# Patient Record
Sex: MALE | Race: BLACK OR AFRICAN AMERICAN | Employment: FULL TIME | URBAN - METROPOLITAN AREA
[De-identification: names, ages, dates, MRNs, and addresses within clinical notes are randomized per-mention and may not be internally consistent; named-entity substitution may affect disease eponyms.]

---

## 2017-08-21 ENCOUNTER — OFFICE VISIT (OUTPATIENT)
Dept: UROLOGY | Age: 62
End: 2017-08-21

## 2017-08-21 ENCOUNTER — HOSPITAL ENCOUNTER (OUTPATIENT)
Dept: LAB | Age: 62
Discharge: HOME | End: 2017-08-21
Payer: COMMERCIAL

## 2017-08-21 VITALS
HEART RATE: 78 BPM | SYSTOLIC BLOOD PRESSURE: 124 MMHG | TEMPERATURE: 99 F | BODY MASS INDEX: 35.76 KG/M2 | DIASTOLIC BLOOD PRESSURE: 76 MMHG | OXYGEN SATURATION: 97 % | WEIGHT: 264 LBS | HEIGHT: 72 IN

## 2017-08-21 DIAGNOSIS — N40.1 BPH LOC W URIN OBS/LUTS: ICD-10-CM

## 2017-08-21 DIAGNOSIS — N52.02 CORPORO-VENOUS OCCLUSIVE ERECTILE DYSFUNCTION: ICD-10-CM

## 2017-08-21 DIAGNOSIS — R35.0 FREQUENT URINATION: Primary | ICD-10-CM

## 2017-08-21 DIAGNOSIS — R35.1 NOCTURIA: ICD-10-CM

## 2017-08-21 LAB
BILIRUB UR QL STRIP: NEGATIVE
GLUCOSE UR-MCNC: NEGATIVE MG/DL
KETONES P FAST UR STRIP-MCNC: NEGATIVE MG/DL
PH UR STRIP: 5.5 [PH] (ref 4.6–8)
PROT UR QL STRIP: NEGATIVE MG/DL
PSA SERPL-MCNC: 1.1 NG/ML (ref 0–4)
SP GR UR STRIP: 1.02 (ref 1–1.03)
UA UROBILINOGEN AMB POC: NORMAL (ref 0.2–1)
URINALYSIS CLARITY POC: CLEAR
URINALYSIS COLOR POC: YELLOW
URINE BLOOD POC: NORMAL
URINE LEUKOCYTES POC: NEGATIVE
URINE NITRITES POC: NEGATIVE

## 2017-08-21 PROCEDURE — 84153 ASSAY OF PSA TOTAL: CPT | Performed by: UROLOGY

## 2017-08-21 RX ORDER — TAMSULOSIN HYDROCHLORIDE 0.4 MG/1
0.4 CAPSULE ORAL DAILY
Qty: 30 CAP | Refills: 9 | Status: SHIPPED | OUTPATIENT
Start: 2017-08-21 | End: 2019-10-07 | Stop reason: ALTCHOICE

## 2017-08-21 RX ORDER — VARDENAFIL HYDROCHLORIDE 20 MG/1
20 TABLET ORAL AS NEEDED
Qty: 12 TAB | Refills: 6 | Status: SHIPPED | OUTPATIENT
Start: 2017-08-21 | End: 2019-10-07 | Stop reason: ALTCHOICE

## 2017-08-21 NOTE — PATIENT INSTRUCTIONS
Urine Test: About This Test  What is it? A urine test checks the color, clarity (clear or cloudy), odor, concentration, and acidity (pH) of your urine. It also checks your levels of protein, sugar, blood cells, or other substances in your urine. This test is sometimes called a urinalysis. Why is this test done? A urine test may be done:  · To check for a disease or infection of the urinary tract. The urinary tract includes the kidneys, the tubes that carry urine from the kidneys to the bladder (ureters), and the bladder. It also includes the tube that carries urine from the bladder to outside the body (urethra). · To check the treatment of conditions such as diabetes, kidney stones, a urinary tract infection (UTI), high blood pressure, or some kidney or liver diseases. How can you prepare for the test?  · Before the test, don't eat foods that can change the color of your urine. Examples of these include blackberries, beets, and rhubarb. · Don't do heavy exercise before the test.  · Tell your doctor if you are menstruating or close to starting your period. Your doctor may want to wait to do the test.  · Tell your doctor about all the nonprescription and prescription medicines and herbs or other supplements you take. Some of these can affect the results of this test.  What happens during the test?  A urine test can be done in your doctor's office, clinic, or lab. Or you may be asked to collect a urine sample at home. Then you can take it to the office or lab for testing. Clean-catch midstream urine collection  · Wash your hands before you start. · If the cup you are given has a lid, remove it carefully. Set it down with the inner surface up. Don't touch the inside of the cup with your fingers. · Clean the area around your genitals. ¨ For men: Pull back the foreskin, if present. Clean the head of your penis with medicated towelettes or swabs.   ¨ For women: Spread open the genital folds of skin with one hand. Then use medicated towelettes or swabs in your other hand to clean the area where urine comes out (the urethra). Wipe the area from front to back. · Start urinating into the toilet or urinal. A woman should hold apart the genital folds of skin while she urinates. · After the urine has flowed for several seconds, place the cup into the urine stream. Collect about 2 ounces of urine without stopping your flow of urine. · Don't touch the rim of the cup to your genital area. Don't get toilet paper, pubic hair, stool (feces), menstrual blood, or anything else in the urine sample. · Finish urinating into the toilet or urinal.  · Carefully replace and tighten the lid on the cup, and then return it to the lab. If you are collecting the urine at home and can't get it to the lab in an hour, refrigerate it. Double-voided urine sample collection  This method collects the urine your body is making right now. · Urinate into the toilet or urinal. Don't collect any of this urine. · Drink a large glass of water, and wait about 30 to 40 minutes. · Then get a urine sample. Follow the instructions above for collecting a clean-catch urine sample. · Take the urine sample to the lab. If you are collecting the urine at home and can't get it to the lab in an hour, refrigerate it. Follow-up care is a key part of your treatment and safety. Be sure to make and go to all appointments, and call your doctor if you are having problems. It's also a good idea to keep a list of the medicines you take. Ask your doctor when you can expect to have your test results. Where can you learn more? Go to http://christy-lavinia.info/. Enter R266 in the search box to learn more about \"Urine Test: About This Test.\"  Current as of: October 14, 2016  Content Version: 11.3  © 9660-8758 [employer].  Care instructions adapted under license by [employer] (which disclaims liability or warranty for this information). If you have questions about a medical condition or this instruction, always ask your healthcare professional. [place] [address] any warranty or liability for your use of this information.

## 2017-08-21 NOTE — PROGRESS NOTES
Curly Winter 62 y.o. male     Mr. Vaughn Sheets seen today for evaluation of obstructive and irritative voiding symptoms as well as erectile dysfunction  Patient describes a solid stream with diminished caliber and intermittency not associated with dysuria or hematuria-symptoms progressing slowly over the past few years nocturia 2-3 episodes per night attributed to hydrochlorothiazide diuretic treatment of hypertension patient also has family history of prostate malignancy 2 older brothers  In their 62s treated for prostate malignancy  Erectile dysfunction responding somewhat favorably to Cialis but unfavorable side effects prompted discontinuation of [place] treatment    Patient has no history of  tract disease, trauma, or surgery      PSA 1.5 in 2015    Review of Systems:   CNS: No seizure syncope headaches dizziness or visual changes  Respiratory: No wheezing no shortness of breath  Cardiovascular: No angina no chest pain-hypertension  Intestinal: GERD-no diarrhea no constipation  Urinary: Obstructive and irritative voiding symptoms  Skeletal: No bone or joint pain  Endocrine: No diabetes  Other: Erectile dysfunction    Allergies: No Known Allergies   Medications:    Current Outpatient Prescriptions   Medication Sig Dispense Refill    OTHER daily. Indications: neutrogena testosterone booster      tamsulosin (FLOMAX) 0.4 mg capsule Take 1 Cap by mouth daily. Indications: SYMPTOMATIC BENIGN PROSTATIC HYPERPLASIA 30 Cap 9    vardenafil (LEVITRA) 20 mg tablet Take 20 mg by mouth as needed. 12 Tab 6    Cholecalciferol, Vitamin D3, 1,000 unit cap Take  by mouth.  hydrochlorothiazide (HYDRODIURIL) 25 mg tablet Take 25 mg by mouth daily.  sitaGLIPtin (JANUVIA) 50 mg tablet Take 50 mg by mouth daily.  amLODIPine-benazepril (LOTREL) 10-20 mg per capsule Take 1 Cap by mouth daily.  ketoconazole (NIZORAL) 2 % shampoo Apply  to affected area daily as needed for Itching.       pantoprazole (PROTONIX) 40 mg granules for oral suspension 40 mg daily.  budesonide-formoterol (SYMBICORT) 160-4.5 mcg/actuation HFA inhaler Take 2 Puffs by inhalation two (2) times a day. Past Medical History:   Diagnosis Date    Aortic insufficiency     Chronic airway obstruction (HCC)     Diabetes mellitus (HCC)     Erectile dysfunction     Hepatitis B carrier (HCC)     Hyperglycemia     Hypertension     Mitral regurgitation     trace    Onychomycosis     Polyp of colon     Stiff joint     Stomach pain     Tobacco abuse disorder     Vitamin D deficiency       Past Surgical History:   Procedure Laterality Date    HX COLONOSCOPY       Family History   Problem Relation Age of Onset    Heart Disease Mother     Other Brother      Prostate problem     Other Brother         Physical Examination: Well-nourished mature male in no apparent distress    Abdomen is nontender no palpable masses no organomegaly  Back-no percussion CVA tenderness on either side  No inguinal hernia or adenopathy  Penis is normal  Testes are normal in size shape and consistency bilaterally-no epididymal induration or tenderness on either side  Spermatic cords are palpably normal bilaterally  Scrotum is normal  Prostate by SEVEN is rounded, smooth, benign in consistency and nontender-no induration no nodularity  No rectal masses tenderness or induration      Urinalysis: Negative dipstick/nitrite negative    PVR today 30 cc        Impression: Symptomatic BPH-L UTS                        Family history of prostate malignancy                        Erectile dysfunction        Plan: Flomax 0.4 mg daily #90 refill ×3            Levitra 20 mg #12 refill ×6 [facility] pharmacy referral            PSA today    rtc 6 mo pvr          Chantelle Hanson MD  -electronically signed-    PLEASE NOTE:  This document has been produced using voice recognition software. Unrecognized errors in transcription may be present.

## 2017-08-21 NOTE — PROGRESS NOTES
Mr. Calos Gonzalez has a reminder for a \"due or due soon\" health maintenance. I have asked that he contact his primary care provider for follow-up on this health maintenance. RBV per Dr. Vincent Sanchez blood drawn in office today for PSA.

## 2017-08-21 NOTE — MR AVS SNAPSHOT
Visit Information Date & Time Provider Department Dept. Phone Encounter #  
 8/21/2017 10:00 AM [facility] Weber[facility] [address] [facility] [id number] Follow-up Instructions Return in about 6 months (around 2/21/2018) for Follow-up BPH PVR. Follow-up and Disposition History Your Appointments 2/20/2018 10:00 AM  
ULTRASOUND with Reid Weber MD  
[facility] [address] [facility]) Appt Note: PVR  
 [address] [place] [address] [address]  
[phone] [address] [place] [id number] Upcoming Health Maintenance Date Due Hepatitis C Screening 1955 DTaP/Tdap/Td series (1 - Tdap) 8/22/1977 FOBT Q 1 YEAR AGE 50-75 8/22/2006 ZOSTER VACCINE AGE 60> 6/22/2016 INFLUENZA AGE 9 TO ADULT 8/1/2017 Allergies as of 8/21/2017  Review Complete On: 8/21/2017 By: Reid Weber MD  
 No Known Allergies Current Immunizations  Never Reviewed No immunizations on file. Not reviewed this visit You Were Diagnosed With   
  
 Codes Comments Frequent urination    -  Primary ICD-10-CM: R35.0 ICD-9-CM: 788.41   
 BPH loc w urin obs/LUTS     ICD-10-CM: N40.1 ICD-9-CM: 600.21, 599.69 Corporo-venous occlusive erectile dysfunction     ICD-10-CM: N52.02 
ICD-9-CM: 607.84 Vitals BP Pulse Temp Height(growth percentile) Weight(growth percentile) SpO2  
 124/76 (BP 1 Location: Left arm, BP Patient Position: Sitting) 78 99 °F (37.2 °C) 6' (1.829 m) 264 lb (119.7 kg) 97% BMI Smoking Status 35.8 kg/m2 Former Smoker Vitals History BMI and BSA Data Body Mass Index Body Surface Area  
 35.8 kg/m 2 2.47 m 2 Preferred Pharmacy Pharmacy Name Phone [address], [address] [place] [address] [phone] Your Updated Medication List  
  
   
 This list is accurate as of: 8/21/17 11:03 AM.  Always use your most recent med list.  
  
  
  
  
 cholecalciferol 1,000 unit Cap Commonly known as:  VITAMIN D3 Take  by mouth. hydroCHLOROthiazide 25 mg tablet Commonly known as:  HYDRODIURIL Take 25 mg by mouth daily. JANUVIA 50 mg tablet Generic drug:  SITagliptin Take 50 mg by mouth daily. LOTREL 10-20 mg per capsule Generic drug:  amLODIPine-benazepril Take 1 Cap by mouth daily. NIZORAL 2 % shampoo Generic drug:  ketoconazole Apply  to affected area daily as needed for Itching. OTHER  
daily. Indications: neutrogena testosterone booster PROTONIX 40 mg granules for oral suspension Generic drug:  pantoprazole 40 mg daily. SYMBICORT 160-4.5 mcg/actuation HFA inhaler Generic drug:  budesonide-formoterol Take 2 Puffs by inhalation two (2) times a day. tamsulosin 0.4 mg capsule Commonly known as:  FLOMAX Take 1 Cap by mouth daily. Indications: SYMPTOMATIC BENIGN PROSTATIC HYPERPLASIA  
  
 vardenafil 20 mg tablet Commonly known as:  LEVITRA Take 20 mg by mouth as needed. Prescriptions Printed Refills  
 tamsulosin (FLOMAX) 0.4 mg capsule 9 Sig: Take 1 Cap by mouth daily. Indications: SYMPTOMATIC BENIGN PROSTATIC HYPERPLASIA Class: Print Route: Oral  
 vardenafil (LEVITRA) 20 mg tablet 6 Sig: Take 20 mg by mouth as needed. Class: Print Route: Oral  
  
We Performed the Following AMB POC URINALYSIS DIP STICK AUTO W/O MICRO [95190 CPT(R)] CA COLLECTION VENOUS BLOOD,VENIPUNCTURE [id number] CPT(R)] CA FEROZ,POST-VOID RES,US,NON-IMAGING [id number] CPT(R)] Follow-up Instructions Return in about 6 months (around 2/21/2018) for Follow-up BPH PVR. To-Do List   
 08/21/2017 Lab:  PSA, DIAGNOSTIC (PROSTATE SPECIFIC AG) Patient Instructions Urine Test: About This Test 
What is it? A urine test checks the color, clarity (clear or cloudy), odor, concentration, and acidity (pH) of your urine. It also checks your levels of protein, sugar, blood cells, or other substances in your urine. This test is sometimes called a urinalysis. Why is this test done? A urine test may be done: · To check for a disease or infection of the urinary tract. The urinary tract includes the kidneys, the tubes that carry urine from the kidneys to the bladder (ureters), and the bladder. It also includes the tube that carries urine from the bladder to outside the body (urethra). · To check the treatment of conditions such as diabetes, kidney stones, a urinary tract infection (UTI), high blood pressure, or some kidney or liver diseases. How can you prepare for the test? 
· Before the test, don't eat foods that can change the color of your urine. Examples of these include blackberries, beets, and rhubarb. · Don't do heavy exercise before the test. 
· Tell your doctor if you are menstruating or close to starting your period. Your doctor may want to wait to do the test. 
· Tell your doctor about all the nonprescription and prescription medicines and herbs or other supplements you take. Some of these can affect the results of this test. 
What happens during the test? 
A urine test can be done in your doctor's office, clinic, or lab. Or you may be asked to collect a urine sample at home. Then you can take it to the office or lab for testing. Clean-catch midstream urine collection · Wash your hands before you start. · If the cup you are given has a lid, remove it carefully. Set it down with the inner surface up. Don't touch the inside of the cup with your fingers. · Clean the area around your genitals. ¨ For men: Pull back the foreskin, if present. Clean the head of your penis with medicated towelettes or swabs. ¨ For women: Spread open the genital folds of skin with one hand.  Then use medicated towelettes or swabs in your other hand to clean the area where urine comes out (the urethra). Wipe the area from front to back. · Start urinating into the toilet or urinal. A woman should hold apart the genital folds of skin while she urinates. · After the urine has flowed for several seconds, place the cup into the urine stream. Collect about 2 ounces of urine without stopping your flow of urine. · Don't touch the rim of the cup to your genital area. Don't get toilet paper, pubic hair, stool (feces), menstrual blood, or anything else in the urine sample. · Finish urinating into the toilet or urinal. 
· Carefully replace and tighten the lid on the cup, and then return it to the lab. If you are collecting the urine at home and can't get it to the lab in an hour, refrigerate it. Double-voided urine sample collection This method collects the urine your body is making right now. · Urinate into the toilet or urinal. Don't collect any of this urine. · Drink a large glass of water, and wait about 30 to 40 minutes. · Then get a urine sample. Follow the instructions above for collecting a clean-catch urine sample. · Take the urine sample to the lab. If you are collecting the urine at home and can't get it to the lab in an hour, refrigerate it. Follow-up care is a key part of your treatment and safety. Be sure to make and go to all appointments, and call your doctor if you are having problems. It's also a good idea to keep a list of the medicines you take. Ask your doctor when you can expect to have your test results. Where can you learn more? Go to http://christy-lavinia.info/. Enter R266 in the search box to learn more about \"Urine Test: About This Test.\" Current as of: October 14, 2016 Content Version: 11.3 © 4403-5152 [employer].  Care instructions adapted under license by [employer] (which disclaims liability or warranty for this information). If you have questions about a medical condition or this instruction, always ask your healthcare professional. [address] any warranty or liability for your use of this information. Patient Instructions History Introducing [facility]! Abena [facility] introduces [employer] patient portal. Now you can access parts of your medical record, email your doctor's office, and request medication refills online. 1. In your internet browser, go to [email or web address] 2. Click on the First Time User? Click Here link in the Sign In box. You will see the New Member Sign Up page. 3. Enter your [employer] Access Code exactly as it appears below. You will not need to use this code after youve completed the sign-up process. If you do not sign up before the expiration date, you must request a new code. · [employer] Access Code: [email or web address] Expires: 11/19/2017  9:57 AM 
 
4. Enter the last four digits of your Social Security Number (xxxx) and Date of Birth (mm/dd/yyyy) as indicated and click Submit. You will be taken to the next sign-up page. 5. Create a [employer] ID. This will be your [employer] login ID and cannot be changed, so think of one that is secure and easy to remember. 6. Create a [employer] password. You can change your password at any time. 7. Enter your Password Reset Question and Answer. This can be used at a later time if you forget your password. 8. Enter your e-mail address. You will receive e-mail notification when new information is available in [address] 9. Click Sign Up. You can now view and download portions of your medical record. 10. Click the Download Summary menu link to download a portable copy of your medical information. If you have questions, please visit the Frequently Asked Questions section of the [employer] website. Remember, [employer] is NOT to be used for urgent needs. For medical emergencies, dial 911. Now available from your iPhone and Android! Please provide this summary of care documentation to your next provider. Your primary care clinician is listed as Kinjal Gamez. If you have any questions after today's visit, please call [phone].

## 2018-06-19 ENCOUNTER — HOSPITAL ENCOUNTER (OUTPATIENT)
Dept: GENERAL RADIOLOGY | Age: 63
Discharge: HOME | End: 2018-06-19
Payer: COMMERCIAL

## 2018-06-19 DIAGNOSIS — M54.30 SCIATICA: ICD-10-CM

## 2018-06-19 PROCEDURE — 72114 X-RAY EXAM L-S SPINE BENDING: CPT

## 2018-06-19 PROCEDURE — 73502 X-RAY EXAM HIP UNI 2-3 VIEWS: CPT

## 2021-07-24 ENCOUNTER — HOSPITAL ENCOUNTER (EMERGENCY)
Age: 66
Discharge: HOME | DRG: 872 | End: 2021-07-24
Attending: EMERGENCY MEDICINE
Payer: COMMERCIAL

## 2021-07-24 ENCOUNTER — APPOINTMENT (OUTPATIENT)
Dept: GENERAL RADIOLOGY | Age: 66
DRG: 872 | End: 2021-07-24
Attending: EMERGENCY MEDICINE
Payer: COMMERCIAL

## 2021-07-24 VITALS
BODY MASS INDEX: 38.6 KG/M2 | SYSTOLIC BLOOD PRESSURE: 95 MMHG | OXYGEN SATURATION: 96 % | RESPIRATION RATE: 22 BRPM | WEIGHT: 285 LBS | HEIGHT: 72 IN | TEMPERATURE: 102.5 F | HEART RATE: 111 BPM | DIASTOLIC BLOOD PRESSURE: 38 MMHG

## 2021-07-24 DIAGNOSIS — N39.0 URINARY TRACT INFECTION WITHOUT HEMATURIA, SITE UNSPECIFIED: Primary | ICD-10-CM

## 2021-07-24 LAB
ANION GAP SERPL CALC-SCNC: 5 MMOL/L (ref 3–18)
APPEARANCE UR: ABNORMAL
BACTERIA URNS QL MICRO: ABNORMAL /HPF
BASOPHILS # BLD: 0 K/UL (ref 0–0.1)
BASOPHILS NFR BLD: 0 % (ref 0–2)
BILIRUB UR QL: NEGATIVE
BUN SERPL-MCNC: 19 MG/DL (ref 7–18)
BUN/CREAT SERPL: 14 (ref 12–20)
CALCIUM SERPL-MCNC: 8.8 MG/DL (ref 8.5–10.1)
CHLORIDE SERPL-SCNC: 101 MMOL/L (ref 100–111)
CK MB CFR SERPL CALC: ABNORMAL % (ref 0–4)
CK MB SERPL-MCNC: <1 NG/ML (ref 5–25)
CK SERPL-CCNC: 532 U/L (ref 39–308)
CO2 SERPL-SCNC: 30 MMOL/L (ref 21–32)
COLOR UR: ABNORMAL
COVID-19 RAPID TEST, COVR: NOT DETECTED
CREAT SERPL-MCNC: 1.35 MG/DL (ref 0.6–1.3)
DIFFERENTIAL METHOD BLD: ABNORMAL
EOSINOPHIL # BLD: 0 K/UL (ref 0–0.4)
EOSINOPHIL NFR BLD: 0 % (ref 0–5)
EPITH CASTS URNS QL MICRO: ABNORMAL /LPF (ref 0–5)
ERYTHROCYTE [DISTWIDTH] IN BLOOD BY AUTOMATED COUNT: 14.4 % (ref 11.6–14.5)
GLUCOSE SERPL-MCNC: 188 MG/DL (ref 74–99)
GLUCOSE UR STRIP.AUTO-MCNC: NEGATIVE MG/DL
HCT VFR BLD AUTO: 36.8 % (ref 36–48)
HGB BLD-MCNC: 12.2 G/DL (ref 13–16)
HGB UR QL STRIP: ABNORMAL
KETONES UR QL STRIP.AUTO: ABNORMAL MG/DL
LACTATE BLD-SCNC: 1.66 MMOL/L (ref 0.4–2)
LEUKOCYTE ESTERASE UR QL STRIP.AUTO: ABNORMAL
LYMPHOCYTES # BLD: 1 K/UL (ref 0.9–3.6)
LYMPHOCYTES NFR BLD: 8 % (ref 21–52)
MCH RBC QN AUTO: 29.3 PG (ref 24–34)
MCHC RBC AUTO-ENTMCNC: 33.2 G/DL (ref 31–37)
MCV RBC AUTO: 88.2 FL (ref 74–97)
MONOCYTES # BLD: 0.6 K/UL (ref 0.05–1.2)
MONOCYTES NFR BLD: 5 % (ref 3–10)
MUCOUS THREADS URNS QL MICRO: ABNORMAL /LPF
NEUTS SEG # BLD: 10.2 K/UL (ref 1.8–8)
NEUTS SEG NFR BLD: 86 % (ref 40–73)
NITRITE UR QL STRIP.AUTO: NEGATIVE
PH UR STRIP: 5.5 [PH] (ref 5–8)
PLATELET # BLD AUTO: 183 K/UL (ref 135–420)
PMV BLD AUTO: 9.9 FL (ref 9.2–11.8)
POTASSIUM SERPL-SCNC: 3.9 MMOL/L (ref 3.5–5.5)
PROT UR STRIP-MCNC: 100 MG/DL
RBC # BLD AUTO: 4.17 M/UL (ref 4.35–5.65)
RBC #/AREA URNS HPF: ABNORMAL /HPF (ref 0–5)
SODIUM SERPL-SCNC: 136 MMOL/L (ref 136–145)
SOURCE, COVRS: NORMAL
SP GR UR REFRACTOMETRY: 1.03 (ref 1–1.03)
TROPONIN I SERPL-MCNC: <0.02 NG/ML (ref 0–0.04)
UROBILINOGEN UR QL STRIP.AUTO: 2 EU/DL (ref 0.2–1)
WBC # BLD AUTO: 11.9 K/UL (ref 4.6–13.2)
WBC URNS QL MICRO: ABNORMAL /HPF (ref 0–4)

## 2021-07-24 PROCEDURE — 81001 URINALYSIS AUTO W/SCOPE: CPT

## 2021-07-24 PROCEDURE — 99285 EMERGENCY DEPT VISIT HI MDM: CPT

## 2021-07-24 PROCEDURE — 71045 X-RAY EXAM CHEST 1 VIEW: CPT

## 2021-07-24 PROCEDURE — 82553 CREATINE MB FRACTION: CPT

## 2021-07-24 PROCEDURE — 93005 ELECTROCARDIOGRAM TRACING: CPT

## 2021-07-24 PROCEDURE — 80048 BASIC METABOLIC PNL TOTAL CA: CPT

## 2021-07-24 PROCEDURE — 85025 COMPLETE CBC W/AUTO DIFF WBC: CPT

## 2021-07-24 PROCEDURE — 87040 BLOOD CULTURE FOR BACTERIA: CPT

## 2021-07-24 PROCEDURE — 96374 THER/PROPH/DIAG INJ IV PUSH: CPT

## 2021-07-24 PROCEDURE — 74011250636 HC RX REV CODE- 250/636: Performed by: EMERGENCY MEDICINE

## 2021-07-24 PROCEDURE — 87186 SC STD MICRODIL/AGAR DIL: CPT

## 2021-07-24 PROCEDURE — 87635 SARS-COV-2 COVID-19 AMP PRB: CPT

## 2021-07-24 PROCEDURE — 74011000250 HC RX REV CODE- 250: Performed by: EMERGENCY MEDICINE

## 2021-07-24 PROCEDURE — 87077 CULTURE AEROBIC IDENTIFY: CPT

## 2021-07-24 PROCEDURE — 83605 ASSAY OF LACTIC ACID: CPT

## 2021-07-24 RX ORDER — SODIUM CHLORIDE 0.9 % (FLUSH) 0.9 %
5-10 SYRINGE (ML) INJECTION AS NEEDED
Status: DISCONTINUED | OUTPATIENT
Start: 2021-07-24 | End: 2021-07-24 | Stop reason: HOSPADM

## 2021-07-24 RX ORDER — CEPHALEXIN 500 MG/1
500 CAPSULE ORAL 4 TIMES DAILY
Qty: 40 CAPSULE | Refills: 0 | Status: SHIPPED | OUTPATIENT
Start: 2021-07-24 | End: 2021-07-27

## 2021-07-24 RX ADMIN — SODIUM CHLORIDE 2328 ML: 900 INJECTION, SOLUTION INTRAVENOUS at 16:37

## 2021-07-24 RX ADMIN — CEFEPIME HYDROCHLORIDE 2 G: 2 INJECTION, POWDER, FOR SOLUTION INTRAVENOUS at 17:11

## 2021-07-24 NOTE — ED PROVIDER NOTES
EMERGENCY DEPARTMENT HISTORY AND PHYSICAL EXAM    7:21 PM late entry, patient was seen, earlier in the super track room      Date: 7/24/2021  Patient Name: Kasi Manzano    History of Presenting Illness     Chief Complaint   Patient presents with    Fever    Chills    Urinary Frequency         History Provided By: patient    Additional History (Context): Kasi Manzano is a 60 y.o. male presents with several days of not feeling well this morning was very lethargic, did not feel like going and running around with his wife so was brought here to the emergency department bit of dysuria no cough no vomiting diarrhea or abdominal pain. He has had some shaking chills and sweats. Minimal discomfort. PCP: Chloe Callahan MD    Chief Complaint:   Duration:    Timing:    Location:   Quality:   Severity:   Modifying Factors:   Associated Symptoms:       Current Facility-Administered Medications   Medication Dose Route Frequency Provider Last Rate Last Admin    sodium chloride (NS) flush 5-10 mL  5-10 mL IntraVENous PRN Crystal Umana MD         Current Outpatient Medications   Medication Sig Dispense Refill    cephALEXin (Keflex) 500 mg capsule Take 1 Capsule by mouth four (4) times daily for 10 days. 40 Capsule 0    glimepiride (AMARYL) 2 mg tablet       sildenafil citrate (VIAGRA) 100 mg tablet Take 1 Tab by mouth daily as needed (ED). Take 1/2 to 1 tablet as needed. 10 Tab 6    tamsulosin (FLOMAX) 0.4 mg capsule Take 1 Cap by mouth daily (after dinner). 90 Cap 3    OTHER daily. Indications: neutrogena testosterone booster      Cholecalciferol, Vitamin D3, 1,000 unit cap Take  by mouth.  hydrochlorothiazide (HYDRODIURIL) 25 mg tablet Take 25 mg by mouth daily.  sitaGLIPtin (JANUVIA) 50 mg tablet Take 50 mg by mouth daily.  amLODIPine-benazepril (LOTREL) 10-20 mg per capsule Take 1 Cap by mouth daily.       ketoconazole (NIZORAL) 2 % shampoo Apply  to affected area daily as needed for Itching.  budesonide-formoterol (SYMBICORT) 160-4.5 mcg/actuation HFA inhaler Take 2 Puffs by inhalation two (2) times a day. Past History     Past Medical History:  Past Medical History:   Diagnosis Date    Aortic insufficiency     Chronic airway obstruction (HCC)     Diabetes mellitus (HCC)     Erectile dysfunction     Feeling of incomplete bladder emptying     Hepatitis B carrier (HCC)     Hyperglycemia     Hypertension     Mitral regurgitation     trace    Onychomycosis     Polyp of colon     Stiff joint     Stomach pain     Tobacco abuse disorder     Urinary urgency     Vitamin D deficiency        Past Surgical History:  Past Surgical History:   Procedure Laterality Date    HX COLONOSCOPY         Family History:  Family History   Problem Relation Age of Onset    Heart Disease Mother     Other Brother         Prostate problem     Other Brother        Social History:  Social History     Tobacco Use    Smoking status: Former Smoker     Packs/day: 1.00     Years: 30.00     Pack years: 30.00     Types: Cigarettes     Quit date: 2009     Years since quittin.8    Smokeless tobacco: Never Used   Substance Use Topics    Alcohol use: Yes     Alcohol/week: 0.0 standard drinks     Comment: occassionaly    Drug use: No       Allergies: Allergies   Allergen Reactions    Metformin Other (comments)     dizziness         Review of Systems     Review of Systems   Constitutional: Positive for diaphoresis and fever. HENT: Negative for congestion and sore throat. Eyes: Negative for pain and itching. Respiratory: Negative for cough and shortness of breath. Cardiovascular: Negative for chest pain and palpitations. Gastrointestinal: Negative for abdominal pain and diarrhea. Endocrine: Negative for polydipsia and polyuria. Genitourinary: Positive for dysuria. Negative for hematuria. Musculoskeletal: Negative for arthralgias and myalgias.    Skin: Negative for rash and wound.   Neurological: Negative for seizures and syncope. Hematological: Does not bruise/bleed easily. Psychiatric/Behavioral: Negative for agitation and hallucinations. Physical Exam       Patient Vitals for the past 12 hrs:   Temp Pulse Resp BP SpO2   07/24/21 1614 (!) 102.5 °F (39.2 °C) (!) 126 26 (!) 138/90 97 %       Physical Exam  Vitals and nursing note reviewed. Constitutional:       General: He is not in acute distress. Appearance: Normal appearance. He is well-developed. He is not ill-appearing. HENT:      Head: Normocephalic and atraumatic. Eyes:      General: No scleral icterus. Conjunctiva/sclera: Conjunctivae normal.   Neck:      Vascular: No JVD. Cardiovascular:      Rate and Rhythm: Normal rate and regular rhythm. Heart sounds: Normal heart sounds. Comments: 4 intact extremity pulses  Pulmonary:      Effort: Pulmonary effort is normal.      Breath sounds: Normal breath sounds. Abdominal:      Palpations: Abdomen is soft. There is no mass. Tenderness: There is no abdominal tenderness. Musculoskeletal:         General: Normal range of motion. Cervical back: Normal range of motion and neck supple. Lymphadenopathy:      Cervical: No cervical adenopathy. Skin:     General: Skin is warm and dry. Neurological:      Mental Status: He is alert. Diagnostic Study Results   Labs -  Recent Results (from the past 12 hour(s))   CBC WITH AUTOMATED DIFF    Collection Time: 07/24/21  4:20 PM   Result Value Ref Range    WBC 11.9 4.6 - 13.2 K/uL    RBC 4.17 (L) 4.35 - 5.65 M/uL    HGB 12.2 (L) 13.0 - 16.0 g/dL    HCT 36.8 36.0 - 48.0 %    MCV 88.2 74.0 - 97.0 FL    MCH 29.3 24.0 - 34.0 PG    MCHC 33.2 31.0 - 37.0 g/dL    RDW 14.4 11.6 - 14.5 %    PLATELET 481 142 - 202 K/uL    MPV 9.9 9.2 - 11.8 FL    NEUTROPHILS 86 (H) 40 - 73 %    LYMPHOCYTES 8 (L) 21 - 52 %    MONOCYTES 5 3 - 10 %    EOSINOPHILS 0 0 - 5 %    BASOPHILS 0 0 - 2 %    ABS.  NEUTROPHILS 10. 2 (H) 1.8 - 8.0 K/UL    ABS. LYMPHOCYTES 1.0 0.9 - 3.6 K/UL    ABS. MONOCYTES 0.6 0.05 - 1.2 K/UL    ABS. EOSINOPHILS 0.0 0.0 - 0.4 K/UL    ABS. BASOPHILS 0.0 0.0 - 0.1 K/UL    DF AUTOMATED     METABOLIC PANEL, BASIC    Collection Time: 07/24/21  4:20 PM   Result Value Ref Range    Sodium 136 136 - 145 mmol/L    Potassium 3.9 3.5 - 5.5 mmol/L    Chloride 101 100 - 111 mmol/L    CO2 30 21 - 32 mmol/L    Anion gap 5 3.0 - 18 mmol/L    Glucose 188 (H) 74 - 99 mg/dL    BUN 19 (H) 7.0 - 18 MG/DL    Creatinine 1.35 (H) 0.6 - 1.3 MG/DL    BUN/Creatinine ratio 14 12 - 20      GFR est AA >60 >60 ml/min/1.73m2    GFR est non-AA 53 (L) >60 ml/min/1.73m2    Calcium 8.8 8.5 - 10.1 MG/DL   CARDIAC PANEL,(CK, CKMB & TROPONIN)    Collection Time: 07/24/21  4:20 PM   Result Value Ref Range    CK - MB <1.0 <3.6 ng/ml    CK-MB Index  0.0 - 4.0 %     CALCULATION NOT PERFORMED WHEN RESULT IS BELOW LINEAR LIMIT     (H) 39 - 308 U/L    Troponin-I, QT <0.02 0.0 - 0.045 NG/ML   POC LACTIC ACID    Collection Time: 07/24/21  4:22 PM   Result Value Ref Range    Lactic Acid (POC) 1.66 0.40 - 2.00 mmol/L   URINALYSIS W/ RFLX MICROSCOPIC    Collection Time: 07/24/21  5:20 PM   Result Value Ref Range    Color DARK YELLOW      Appearance CLOUDY      Specific gravity 1.027 1.005 - 1.030      pH (UA) 5.5 5.0 - 8.0      Protein 100 (A) NEG mg/dL    Glucose Negative NEG mg/dL    Ketone TRACE (A) NEG mg/dL    Bilirubin Negative NEG      Blood MODERATE (A) NEG      Urobilinogen 2.0 (H) 0.2 - 1.0 EU/dL    Nitrites Negative NEG      Leukocyte Esterase LARGE (A) NEG     URINE MICROSCOPIC ONLY    Collection Time: 07/24/21  5:20 PM   Result Value Ref Range    WBC 30 to 40 0 - 4 /hpf    RBC 10 to 20 0 - 5 /hpf    Epithelial cells 2+ 0 - 5 /lpf    Bacteria FEW (A) NEG /hpf    Mucus FEW (A) NEG /lpf       Radiologic Studies -   XR CHEST PORT    (Results Pending)     No results found.     Medications ordered:   Medications   sodium chloride (NS) flush 5-10 mL (has no administration in time range)   sodium chloride 0.9 % bolus infusion 2,328 mL (2,328 mL IntraVENous New Bag 7/24/21 1637)   cefepime (MAXIPIME) 2 g in sterile water (preservative free) 10 mL IV syringe (2 g IntraVENous New Bag 7/24/21 1711)         Medical Decision Making   Initial Medical Decision Making and DDx:  No alarming findings on his chest x-ray. Will treat for urinary tract infection with Keflex. No leukocytosis. Does have a fever. Noted that he should have a low threshold to return to the emergency department if he is feeling worse in 12 to 24 hours. Otherwise 2-day follow-up with primary care, should be feeling significantly better in 3 days. ED Course: Progress Notes, Reevaluation, and Consults:  ED Course as of Jul 24 1921   Sat Jul 24, 2021   1629 Sepsis fluids based on ideal body weight patient is obese.    [CB]      ED Course User Index  [CB] Socorro Denney MD         I am the first provider for this patient. I reviewed the vital signs, available nursing notes, past medical history, past surgical history, family history and social history. Patient Vitals for the past 12 hrs:   Temp Pulse Resp BP SpO2   07/24/21 1614 (!) 102.5 °F (39.2 °C) (!) 126 26 (!) 138/90 97 %       Vital Signs-Reviewed the patient's vital signs. Pulse Oximetry Analysis, Cardiac Monitor, 12 lead ekg:       Interpreted by the EP. Records Reviewed: Nursing notes reviewed (Time of Review: 7:21 PM)    Procedures:   Critical Care Time:   Aspirin: (was aspirin given for stroke?)    Diagnosis     Clinical Impression:   1.  Urinary tract infection without hematuria, site unspecified        Disposition: Discharged      Follow-up Information     Follow up With Specialties Details Why Contact Info    Nataly Khan MD Family Medicine In 2 days  [address] [place] [address]              Patient's Medications   Start Taking    CEPHALEXIN (KEFLEX) 500 MG CAPSULE Take 1 Capsule by mouth four (4) times daily for 10 days. Continue Taking    AMLODIPINE-BENAZEPRIL (LOTREL) 10-20 MG PER CAPSULE    Take 1 Cap by mouth daily. BUDESONIDE-FORMOTEROL (SYMBICORT) 160-4.5 MCG/ACTUATION HFA INHALER    Take 2 Puffs by inhalation two (2) times a day. CHOLECALCIFEROL, VITAMIN D3, 1,000 UNIT CAP    Take  by mouth. GLIMEPIRIDE (AMARYL) 2 MG TABLET        HYDROCHLOROTHIAZIDE (HYDRODIURIL) 25 MG TABLET    Take 25 mg by mouth daily. KETOCONAZOLE (NIZORAL) 2 % SHAMPOO    Apply  to affected area daily as needed for Itching. OTHER    daily. Indications: neutrogena testosterone booster    SILDENAFIL CITRATE (VIAGRA) 100 MG TABLET    Take 1 Tab by mouth daily as needed (ED). Take 1/2 to 1 tablet as needed. SITAGLIPTIN (JANUVIA) 50 MG TABLET    Take 50 mg by mouth daily. TAMSULOSIN (FLOMAX) 0.4 MG CAPSULE    Take 1 Cap by mouth daily (after dinner).    These Medications have changed    No medications on file   Stop Taking    AMOXICILLIN-CLAVULANATE (AUGMENTIN) 875-125 MG PER TABLET         _______________________________    Notes:    Liliam Arita MD using Dragon dictation      _______________________________

## 2021-07-25 ENCOUNTER — APPOINTMENT (OUTPATIENT)
Dept: GENERAL RADIOLOGY | Age: 66
DRG: 872 | End: 2021-07-25
Attending: EMERGENCY MEDICINE
Payer: COMMERCIAL

## 2021-07-25 ENCOUNTER — APPOINTMENT (OUTPATIENT)
Dept: CT IMAGING | Age: 66
DRG: 872 | End: 2021-07-25
Attending: EMERGENCY MEDICINE
Payer: COMMERCIAL

## 2021-07-25 ENCOUNTER — HOSPITAL ENCOUNTER (INPATIENT)
Age: 66
LOS: 2 days | Discharge: HOME | DRG: 872 | End: 2021-07-27
Attending: EMERGENCY MEDICINE | Admitting: INTERNAL MEDICINE
Payer: COMMERCIAL

## 2021-07-25 DIAGNOSIS — N39.0 ACUTE UTI: ICD-10-CM

## 2021-07-25 DIAGNOSIS — A41.50: Primary | ICD-10-CM

## 2021-07-25 DIAGNOSIS — R78.81 BACTEREMIA: ICD-10-CM

## 2021-07-25 LAB
ALBUMIN SERPL-MCNC: 3.3 G/DL (ref 3.4–5)
ALBUMIN/GLOB SERPL: 0.8 {RATIO} (ref 0.8–1.7)
ALP SERPL-CCNC: 57 U/L (ref 45–117)
ALT SERPL-CCNC: 57 U/L (ref 16–61)
ANION GAP SERPL CALC-SCNC: 6 MMOL/L (ref 3–18)
APPEARANCE UR: CLEAR
AST SERPL-CCNC: 40 U/L (ref 10–38)
ATRIAL RATE: 121 BPM
ATRIAL RATE: 98 BPM
BACTERIA URNS QL MICRO: ABNORMAL /HPF
BASOPHILS # BLD: 0 K/UL (ref 0–0.1)
BASOPHILS NFR BLD: 0 % (ref 0–2)
BILIRUB SERPL-MCNC: 1.1 MG/DL (ref 0.2–1)
BILIRUB UR QL: NEGATIVE
BUN SERPL-MCNC: 14 MG/DL (ref 7–18)
BUN/CREAT SERPL: 10 (ref 12–20)
CALCIUM SERPL-MCNC: 8.3 MG/DL (ref 8.5–10.1)
CALCULATED P AXIS, ECG09: 56 DEGREES
CALCULATED P AXIS, ECG09: 61 DEGREES
CALCULATED R AXIS, ECG10: 30 DEGREES
CALCULATED R AXIS, ECG10: 61 DEGREES
CALCULATED T AXIS, ECG11: 59 DEGREES
CALCULATED T AXIS, ECG11: 75 DEGREES
CHLORIDE SERPL-SCNC: 101 MMOL/L (ref 100–111)
CO2 SERPL-SCNC: 27 MMOL/L (ref 21–32)
COLOR UR: YELLOW
CREAT SERPL-MCNC: 1.38 MG/DL (ref 0.6–1.3)
DIAGNOSIS, 93000: NORMAL
DIAGNOSIS, 93000: NORMAL
DIFFERENTIAL METHOD BLD: ABNORMAL
EOSINOPHIL # BLD: 0 K/UL (ref 0–0.4)
EOSINOPHIL NFR BLD: 0 % (ref 0–5)
EPITH CASTS URNS QL MICRO: ABNORMAL /LPF (ref 0–5)
ERYTHROCYTE [DISTWIDTH] IN BLOOD BY AUTOMATED COUNT: 14.6 % (ref 11.6–14.5)
GLOBULIN SER CALC-MCNC: 4.4 G/DL (ref 2–4)
GLUCOSE BLD STRIP.AUTO-MCNC: 156 MG/DL (ref 70–110)
GLUCOSE BLD STRIP.AUTO-MCNC: 190 MG/DL (ref 70–110)
GLUCOSE SERPL-MCNC: 182 MG/DL (ref 74–99)
GLUCOSE UR STRIP.AUTO-MCNC: NEGATIVE MG/DL
HCT VFR BLD AUTO: 33.3 % (ref 36–48)
HGB BLD-MCNC: 11 G/DL (ref 13–16)
HGB UR QL STRIP: ABNORMAL
KETONES UR QL STRIP.AUTO: NEGATIVE MG/DL
LACTATE BLD-SCNC: 1.52 MMOL/L (ref 0.4–2)
LEUKOCYTE ESTERASE UR QL STRIP.AUTO: ABNORMAL
LYMPHOCYTES # BLD: 1.1 K/UL (ref 0.9–3.6)
LYMPHOCYTES NFR BLD: 8 % (ref 21–52)
MCH RBC QN AUTO: 28.9 PG (ref 24–34)
MCHC RBC AUTO-ENTMCNC: 33 G/DL (ref 31–37)
MCV RBC AUTO: 87.4 FL (ref 74–97)
MONOCYTES # BLD: 0.9 K/UL (ref 0.05–1.2)
MONOCYTES NFR BLD: 7 % (ref 3–10)
NEUTS SEG # BLD: 11.3 K/UL (ref 1.8–8)
NEUTS SEG NFR BLD: 81 % (ref 40–73)
NITRITE UR QL STRIP.AUTO: NEGATIVE
P-R INTERVAL, ECG05: 150 MS
P-R INTERVAL, ECG05: 152 MS
PH UR STRIP: 5.5 [PH] (ref 5–8)
PLATELET # BLD AUTO: 146 K/UL (ref 135–420)
PMV BLD AUTO: 10.4 FL (ref 9.2–11.8)
POTASSIUM SERPL-SCNC: 3.5 MMOL/L (ref 3.5–5.5)
PROT SERPL-MCNC: 7.7 G/DL (ref 6.4–8.2)
PROT UR STRIP-MCNC: 30 MG/DL
Q-T INTERVAL, ECG07: 312 MS
Q-T INTERVAL, ECG07: 338 MS
QRS DURATION, ECG06: 74 MS
QRS DURATION, ECG06: 80 MS
QTC CALCULATION (BEZET), ECG08: 431 MS
QTC CALCULATION (BEZET), ECG08: 443 MS
RBC # BLD AUTO: 3.81 M/UL (ref 4.35–5.65)
RBC #/AREA URNS HPF: ABNORMAL /HPF (ref 0–5)
SODIUM SERPL-SCNC: 134 MMOL/L (ref 136–145)
SP GR UR REFRACTOMETRY: 1.01 (ref 1–1.03)
UROBILINOGEN UR QL STRIP.AUTO: 1 EU/DL (ref 0.2–1)
VENTRICULAR RATE, ECG03: 121 BPM
VENTRICULAR RATE, ECG03: 98 BPM
WBC # BLD AUTO: 13.9 K/UL (ref 4.6–13.2)
WBC URNS QL MICRO: ABNORMAL /HPF (ref 0–5)

## 2021-07-25 PROCEDURE — 93005 ELECTROCARDIOGRAM TRACING: CPT

## 2021-07-25 PROCEDURE — 99223 1ST HOSP IP/OBS HIGH 75: CPT | Performed by: INTERNAL MEDICINE

## 2021-07-25 PROCEDURE — 85025 COMPLETE CBC W/AUTO DIFF WBC: CPT

## 2021-07-25 PROCEDURE — 74011000250 HC RX REV CODE- 250: Performed by: EMERGENCY MEDICINE

## 2021-07-25 PROCEDURE — APPSS180 APP SPLIT SHARED TIME > 60 MINUTES: Performed by: NURSE PRACTITIONER

## 2021-07-25 PROCEDURE — 74011250636 HC RX REV CODE- 250/636: Performed by: INTERNAL MEDICINE

## 2021-07-25 PROCEDURE — 65270000029 HC RM PRIVATE

## 2021-07-25 PROCEDURE — 74176 CT ABD & PELVIS W/O CONTRAST: CPT

## 2021-07-25 PROCEDURE — 74011636637 HC RX REV CODE- 636/637: Performed by: INTERNAL MEDICINE

## 2021-07-25 PROCEDURE — 81001 URINALYSIS AUTO W/SCOPE: CPT

## 2021-07-25 PROCEDURE — 94640 AIRWAY INHALATION TREATMENT: CPT

## 2021-07-25 PROCEDURE — 74011000250 HC RX REV CODE- 250: Performed by: INTERNAL MEDICINE

## 2021-07-25 PROCEDURE — 96366 THER/PROPH/DIAG IV INF ADDON: CPT

## 2021-07-25 PROCEDURE — 80053 COMPREHEN METABOLIC PANEL: CPT

## 2021-07-25 PROCEDURE — 74011250637 HC RX REV CODE- 250/637: Performed by: EMERGENCY MEDICINE

## 2021-07-25 PROCEDURE — 87040 BLOOD CULTURE FOR BACTERIA: CPT

## 2021-07-25 PROCEDURE — 87086 URINE CULTURE/COLONY COUNT: CPT

## 2021-07-25 PROCEDURE — 74011250636 HC RX REV CODE- 250/636: Performed by: EMERGENCY MEDICINE

## 2021-07-25 PROCEDURE — 74011250637 HC RX REV CODE- 250/637: Performed by: INTERNAL MEDICINE

## 2021-07-25 PROCEDURE — 83605 ASSAY OF LACTIC ACID: CPT

## 2021-07-25 PROCEDURE — 82962 GLUCOSE BLOOD TEST: CPT

## 2021-07-25 PROCEDURE — 96367 TX/PROPH/DG ADDL SEQ IV INF: CPT

## 2021-07-25 PROCEDURE — 71045 X-RAY EXAM CHEST 1 VIEW: CPT

## 2021-07-25 PROCEDURE — 99284 EMERGENCY DEPT VISIT MOD MDM: CPT

## 2021-07-25 PROCEDURE — 96365 THER/PROPH/DIAG IV INF INIT: CPT

## 2021-07-25 RX ORDER — ONDANSETRON 2 MG/ML
4 INJECTION INTRAMUSCULAR; INTRAVENOUS
Status: DISCONTINUED | OUTPATIENT
Start: 2021-07-25 | End: 2021-07-27 | Stop reason: HOSPADM

## 2021-07-25 RX ORDER — SODIUM CHLORIDE 0.9 % (FLUSH) 0.9 %
5-10 SYRINGE (ML) INJECTION AS NEEDED
Status: DISCONTINUED | OUTPATIENT
Start: 2021-07-25 | End: 2021-07-27 | Stop reason: HOSPADM

## 2021-07-25 RX ORDER — SODIUM CHLORIDE 9 MG/ML
75 INJECTION, SOLUTION INTRAVENOUS CONTINUOUS
Status: DISPENSED | OUTPATIENT
Start: 2021-07-25 | End: 2021-07-26

## 2021-07-25 RX ORDER — MELATONIN
1000 DAILY
Status: DISCONTINUED | OUTPATIENT
Start: 2021-07-26 | End: 2021-07-27 | Stop reason: HOSPADM

## 2021-07-25 RX ORDER — INSULIN GLARGINE 100 [IU]/ML
5 INJECTION, SOLUTION SUBCUTANEOUS
Status: DISCONTINUED | OUTPATIENT
Start: 2021-07-25 | End: 2021-07-27 | Stop reason: HOSPADM

## 2021-07-25 RX ORDER — INSULIN LISPRO 100 [IU]/ML
INJECTION, SOLUTION INTRAVENOUS; SUBCUTANEOUS
Status: DISCONTINUED | OUTPATIENT
Start: 2021-07-25 | End: 2021-07-27 | Stop reason: HOSPADM

## 2021-07-25 RX ORDER — HEPARIN SODIUM 5000 [USP'U]/ML
5000 INJECTION, SOLUTION INTRAVENOUS; SUBCUTANEOUS EVERY 8 HOURS
Status: DISCONTINUED | OUTPATIENT
Start: 2021-07-25 | End: 2021-07-27 | Stop reason: HOSPADM

## 2021-07-25 RX ORDER — HYDRALAZINE HYDROCHLORIDE 10 MG/1
10 TABLET, FILM COATED ORAL
Status: DISCONTINUED | OUTPATIENT
Start: 2021-07-25 | End: 2021-07-27 | Stop reason: HOSPADM

## 2021-07-25 RX ORDER — ACETAMINOPHEN 500 MG
1000 TABLET ORAL
Status: COMPLETED | OUTPATIENT
Start: 2021-07-25 | End: 2021-07-25

## 2021-07-25 RX ORDER — ACETAMINOPHEN 325 MG/1
650 TABLET ORAL
Status: DISCONTINUED | OUTPATIENT
Start: 2021-07-25 | End: 2021-07-27 | Stop reason: HOSPADM

## 2021-07-25 RX ORDER — DEXTROSE 50 % IN WATER (D50W) INTRAVENOUS SYRINGE
25-50 AS NEEDED
Status: DISCONTINUED | OUTPATIENT
Start: 2021-07-25 | End: 2021-07-27 | Stop reason: HOSPADM

## 2021-07-25 RX ORDER — LEVOFLOXACIN 5 MG/ML
750 INJECTION, SOLUTION INTRAVENOUS EVERY 24 HOURS
Status: DISCONTINUED | OUTPATIENT
Start: 2021-07-25 | End: 2021-07-25

## 2021-07-25 RX ORDER — ALBUTEROL SULFATE 0.83 MG/ML
2.5 SOLUTION RESPIRATORY (INHALATION)
Status: DISCONTINUED | OUTPATIENT
Start: 2021-07-25 | End: 2021-07-27 | Stop reason: HOSPADM

## 2021-07-25 RX ORDER — TAMSULOSIN HYDROCHLORIDE 0.4 MG/1
0.4 CAPSULE ORAL
Status: DISCONTINUED | OUTPATIENT
Start: 2021-07-25 | End: 2021-07-27 | Stop reason: HOSPADM

## 2021-07-25 RX ORDER — MAGNESIUM SULFATE 100 %
16 CRYSTALS MISCELLANEOUS AS NEEDED
Status: DISCONTINUED | OUTPATIENT
Start: 2021-07-25 | End: 2021-07-27 | Stop reason: HOSPADM

## 2021-07-25 RX ORDER — AMLODIPINE BESYLATE 10 MG/1
10 TABLET ORAL DAILY
Status: DISCONTINUED | OUTPATIENT
Start: 2021-07-26 | End: 2021-07-27 | Stop reason: HOSPADM

## 2021-07-25 RX ADMIN — INSULIN LISPRO 2 UNITS: 100 INJECTION, SOLUTION INTRAVENOUS; SUBCUTANEOUS at 16:36

## 2021-07-25 RX ADMIN — CEFEPIME HYDROCHLORIDE 2 G: 2 INJECTION, POWDER, FOR SOLUTION INTRAVENOUS at 10:41

## 2021-07-25 RX ADMIN — INSULIN GLARGINE 5 UNITS: 100 INJECTION, SOLUTION SUBCUTANEOUS at 21:29

## 2021-07-25 RX ADMIN — ACETAMINOPHEN 1000 MG: 500 TABLET ORAL at 14:45

## 2021-07-25 RX ADMIN — SODIUM CHLORIDE 1000 ML: 900 INJECTION, SOLUTION INTRAVENOUS at 11:47

## 2021-07-25 RX ADMIN — SODIUM CHLORIDE 75 ML/HR: 900 INJECTION, SOLUTION INTRAVENOUS at 16:20

## 2021-07-25 RX ADMIN — SODIUM CHLORIDE 1000 ML: 900 INJECTION, SOLUTION INTRAVENOUS at 10:34

## 2021-07-25 RX ADMIN — LEVOFLOXACIN 750 MG: 5 INJECTION, SOLUTION INTRAVENOUS at 11:27

## 2021-07-25 RX ADMIN — INSULIN LISPRO 2 UNITS: 100 INJECTION, SOLUTION INTRAVENOUS; SUBCUTANEOUS at 21:30

## 2021-07-25 RX ADMIN — ARFORMOTEROL TARTRATE: 15 SOLUTION RESPIRATORY (INHALATION) at 20:29

## 2021-07-25 RX ADMIN — HEPARIN SODIUM 5000 UNITS: 5000 INJECTION INTRAVENOUS; SUBCUTANEOUS at 16:20

## 2021-07-25 RX ADMIN — SODIUM CHLORIDE 903 ML: 900 INJECTION, SOLUTION INTRAVENOUS at 11:48

## 2021-07-25 RX ADMIN — HEPARIN SODIUM 5000 UNITS: 5000 INJECTION INTRAVENOUS; SUBCUTANEOUS at 23:36

## 2021-07-25 RX ADMIN — TAMSULOSIN HYDROCHLORIDE 0.4 MG: 0.4 CAPSULE ORAL at 18:04

## 2021-07-25 NOTE — H&P
Hospitalist Admission History and Physical    NAME:  Kei Barrett   :   1955   MRN:   [id number]     PCP:  Mason Hernadez MD  Date/Time:  2021 3:41 PM    Subjective:   CHIEF COMPLAINT:  Blood culture positive    HISTORY OF PRESENT ILLNESS:     Deandre Elam is a 60 y.o.  male with past medical history of COPD, diabetes mellitus, erectile dysfunction, hypertension, diabetes and BPH who presents back to [facility] ED due to positive blood cultures. Patient was seen in ED yesterday for treatment of urinary tract infection with plan to continue treatment as outpatient however asked to return to hospital when blood cultures returned as positive with gram-negative rods. Patient reports feeling some urgency for past 1.5 weeks and then with some fevers, chills, pelvic /lower abdominal pain and dysuria for 3 days prior to initial ER presentation. Since discharge from ED yesterday he was still having some chills on and off but overall was feeling improved. He also relays he had stopped taking his Flomax on a daily basis (but still doing occasionally) approximately 3 to 4 weeks ago as he felt he was on too many pills. He follows with urology Dr. Charlet Sandifer as an outpatient for his BPH. At time of current discussion he denies any chills, abdominal or pelvic pain, no dysuria, no fevers headaches, shortness of breath or cough. No nausea or other concerns.     Past Medical History:   Diagnosis Date    Aortic insufficiency     Chronic airway obstruction (HCC)     Diabetes mellitus (HCC)     Erectile dysfunction     Feeling of incomplete bladder emptying     Hepatitis B carrier (HCC)     Hyperglycemia     Hypertension     Mitral regurgitation     trace    Onychomycosis     Polyp of colon     Stiff joint     Stomach pain     Tobacco abuse disorder     Urinary urgency     Vitamin D deficiency        Past Surgical History:   Procedure Laterality Date    HX COLONOSCOPY         Social History     Tobacco Use    Smoking status: Former Smoker     Packs/day: 1.00     Years: 30.00     Pack years: 30.00     Types: Cigarettes     Quit date: 2009     Years since quittin.8    Smokeless tobacco: Never Used   Substance Use Topics    Alcohol use: Yes     Alcohol/week: 0.0 standard drinks     Comment: occassionaly        Family History   Problem Relation Age of Onset    Heart Disease Mother     Other Brother         Prostate problem     Other Brother         Allergies   Allergen Reactions    Metformin Other (comments)     dizziness        Prior to Admission Medications   Prescriptions Last Dose Informant Patient Reported? Taking? Cholecalciferol, Vitamin D3, 1,000 unit cap 2021 at Unknown time  Yes Yes   Sig: Take  by mouth. OTHER Unknown at Unknown time  Yes No   Sig: daily. Indications: neutrogena testosterone booster   amLODIPine-benazepril (LOTREL) 10-20 mg per capsule 2021 at Unknown time  Yes Yes   Sig: Take 1 Cap by mouth daily. budesonide-formoterol (SYMBICORT) 160-4.5 mcg/actuation HFA inhaler 2021 at Unknown time  Yes Yes   Sig: Take 2 Puffs by inhalation two (2) times a day. cephALEXin (Keflex) 500 mg capsule 2021 at Unknown time  No Yes   Sig: Take 1 Capsule by mouth four (4) times daily for 10 days. glimepiride (AMARYL) 2 mg tablet 2021 at Unknown time  Yes Yes   hydrochlorothiazide (HYDRODIURIL) 25 mg tablet 2021 at Unknown time  Yes Yes   Sig: Take 25 mg by mouth daily. ketoconazole (NIZORAL) 2 % shampoo Unknown at Unknown time  Yes No   Sig: Apply  to affected area daily as needed for Itching. sildenafil citrate (VIAGRA) 100 mg tablet 2021 at Unknown time  No Yes   Sig: Take 1 Tab by mouth daily as needed (ED). Take 1/2 to 1 tablet as needed. sitaGLIPtin (JANUVIA) 50 mg tablet 2021 at Unknown time  Yes Yes   Sig: Take 50 mg by mouth daily.    tamsulosin (FLOMAX) 0.4 mg capsule 2021 at Unknown time  No Yes   Sig: Take 1 Cap by mouth daily (after dinner). Facility-Administered Medications: None     REVIEW OF SYSTEMS:     [] Unable to obtain  ROS due to  []mental status change  []sedated   []intubated   [x]Total of 12 systems reviewed as follows:    GENERAL: no current fever or chills; + fever of 101.2 upon presentation to ER earlier today  HEENT: no sinus congestion / hearing or vision changes  NECK: No pain or stiffness. PULMONARY: no shortness of breath or cough  Cardiovascular: denies palpitations, chest pain; no lower extremity edema  GASTROINTESTINAL: Denies abdominal pain, constipation, diarrhea, nausea, vomiting or melena / bloody stools  GENITOURINARY: + Urinary urgency but improved, denies dysuria or hematuria; notes his urine has been dark at home  MUSCULOSKELETAL: no back / joint or muscle pain, no recent trauma. DERMATOLOGIC: denies pruritis, rashes or open areas   ENDOCRINE: No polyuria, polydipsia, no heat or cold intolerance. HEMATOLOGICAL: No easy bruising or bleeding; not seeing any maria d blood in stool  NEUROLOGIC:  no focal weakness,  no speech changes     Objective:   VITALS:    Visit Vitals  BP (!) 140/79   Pulse 89   Temp 98 °F (36.7 °C)   Resp 15   Ht 6' (1.829 m)   Wt 130.1 kg (286 lb 14.4 oz)   SpO2 99%   BMI 38.91 kg/m²     Temp (24hrs), Av.6 °F (37.6 °C), Min:98 °F (36.7 °C), Max:101.2 °F (38.4 °C)    PHYSICAL EXAM:   General:          Alert, in NAD; appears stated age  HEENT:           Sclera anicteric. Conjunctiva pink. Mucous membranes                          Moist, no ear or nasal discharge  Neck:               Supple. Trachea midline. No accessory muscle use. No jugular venous distention, no carotid bruit  CV:                  Regular rate and rhythm. S1S2+  Lungs:             Clear to auscultation bilaterally. No Wheezing or Rhonchi. No rales. Abdomen:        Soft, non-tender. Not distended. Bowel sounds normal  Extremities:     No cyanosis. No edema.  Pulses 2+ b/l  Neurologic:      Alert and oriented X 4. Follows commands, responds appropriately. No focal neurological deficit was noted  Skin:                Warm and dry. No rashes. LAB DATA REVIEWED:    No components found for: [place]  Recent Labs     07/25/21  1036 07/24/21  1620   * 136   K 3.5 3.9    101   CO2 27 30   BUN 14 19*   CREA 1.38* 1.35*   * 188*   CA 8.3* 8.8   ALB 3.3*  --    WBC 13.9* 11.9   HGB 11.0* 12.2*   HCT 33.3* 36.8    183       IMAGING RESULTS:     [x]  I have personally reviewed the actual   [x]CXR (from 7/24/2021)[x]CT scan    Assessment/Plan:      Active Problems:    Sepsis, Gram negative ([facility] [address] (7/25/2021)      Acute UTI (7/25/2021)       ___________________________________________________  PLAN:    1. Urinary tract infection -continue cefepime, follow urine cultures, infectious disease consulted by ED; follow leukocytosis and fevers  2. Bacteremia -likely related to #1, gram-negative rods + in 2/2 blood cultures from 7/24/2021, follow repeat blood cultures pending on 7/25/2021.  3. BPH -continue Flomax, discussed with patient importance of continuing going forward; followed by Dr. Luke All as OP  4. Hypertension -continue Norvasc, follow for need to resume benazepril / hctz  5. Type 2 diabetes with hyperglycemia -hold oral meds, recheck A1c, SSI and low-dose Lantus; diabetes educator  6.  COPD without acute exacerbation -continue nebulizers    CODE STATUS discussed with patient he wishes for full code  He defers update to family states he will update his wife    Consults called / follow up -infectious disease    Prophylaxis:  []Lovenox  []Coumadin  [x]Hep SQ  []SCDs  []H2B/PPI    Discussed Code Status:    [x]Full Code      []DNR     ___________________________________________________  Admitting Provider: Jinny Distance, NP

## 2021-07-25 NOTE — ED PROVIDER NOTES
EMERGENCY DEPARTMENT HISTORY AND PHYSICAL EXAM    Date: 7/25/2021  Patient Name: Sharla Caraballo    History of Presenting Illness     Chief Complaint   Patient presents with    Abnormal Lab Results         History Provided By: Patient and Patient's Wife      Additional History (Context): Sharla Caraballo is a 60 y.o. male with diabetes, hypertension, hyperlipidemia, obesity, COPD and Aortic insufficiency, hepatitis B carrier who presents with having been called by me at home this morning. I received a phone call from the microbiology lab department the patient blood culture was growing out gram-negative rods. Says he feels a little bit better but still having a fever per his wife. Denies nausea vomiting or flank pain. Her pharmacy last night was close to have not yet picked up the prescription. PCP: Tennille Russell MD    Current Facility-Administered Medications   Medication Dose Route Frequency Provider Last Rate Last Admin    sodium chloride (NS) flush 5-10 mL  5-10 mL IntraVENous PRN Kandice Villafuerte PA        cefepime (MAXIPIME) 2 g in sterile water (preservative free) 10 mL IV syringe  2 g IntraVENous Q8H Kandice Villafuerte PA   IV Completed at 07/25/21 1110    levoFLOXacin (LEVAQUIN) 750 mg in D5W IVPB  750 mg IntraVENous Q24H Kandice Villafuerte PA   IV Completed at 07/25/21 1300     Current Outpatient Medications   Medication Sig Dispense Refill    cephALEXin (Keflex) 500 mg capsule Take 1 Capsule by mouth four (4) times daily for 10 days. 40 Capsule 0    glimepiride (AMARYL) 2 mg tablet       sildenafil citrate (VIAGRA) 100 mg tablet Take 1 Tab by mouth daily as needed (ED). Take 1/2 to 1 tablet as needed. 10 Tab 6    tamsulosin (FLOMAX) 0.4 mg capsule Take 1 Cap by mouth daily (after dinner). 90 Cap 3    OTHER daily. Indications: neutrogena testosterone booster      Cholecalciferol, Vitamin D3, 1,000 unit cap Take  by mouth.  hydrochlorothiazide (HYDRODIURIL) 25 mg tablet Take 25 mg by mouth daily.  sitaGLIPtin (JANUVIA) 50 mg tablet Take 50 mg by mouth daily.  amLODIPine-benazepril (LOTREL) 10-20 mg per capsule Take 1 Cap by mouth daily.  ketoconazole (NIZORAL) 2 % shampoo Apply  to affected area daily as needed for Itching.  budesonide-formoterol (SYMBICORT) 160-4.5 mcg/actuation HFA inhaler Take 2 Puffs by inhalation two (2) times a day. Past History     Past Medical History:  Past Medical History:   Diagnosis Date    Aortic insufficiency     Chronic airway obstruction (HCC)     Diabetes mellitus (HCC)     Erectile dysfunction     Feeling of incomplete bladder emptying     Hepatitis B carrier (HCC)     Hyperglycemia     Hypertension     Mitral regurgitation     trace    Onychomycosis     Polyp of colon     Stiff joint     Stomach pain     Tobacco abuse disorder     Urinary urgency     Vitamin D deficiency        Past Surgical History:  Past Surgical History:   Procedure Laterality Date    HX COLONOSCOPY         Family History:  Family History   Problem Relation Age of Onset    Heart Disease Mother     Other Brother         Prostate problem     Other Brother        Social History:  Social History     Tobacco Use    Smoking status: Former Smoker     Packs/day: 1.00     Years: 30.00     Pack years: 30.00     Types: Cigarettes     Quit date: 2009     Years since quittin.8    Smokeless tobacco: Never Used   Substance Use Topics    Alcohol use: Yes     Alcohol/week: 0.0 standard drinks     Comment: occassionaly    Drug use: No       Allergies: Allergies   Allergen Reactions    Metformin Other (comments)     dizziness         Review of Systems   Review of Systems   Constitutional: Positive for fever. HENT: Negative. Eyes: Negative. Respiratory: Negative. Cardiovascular: Negative. Gastrointestinal: Negative for nausea and vomiting. Endocrine: Negative. Genitourinary: Negative for flank pain. Skin: Negative for rash. Allergic/Immunologic: Negative. Neurological: Negative. Hematological: Does not bruise/bleed easily. Psychiatric/Behavioral: Negative. All Other Systems Negative  Physical Exam     Vitals:    07/25/21 1330 07/25/21 1345 07/25/21 1400 07/25/21 1415   BP: (!) 142/76 127/72 129/75 (!) 140/79   Pulse: 90 90 89 89   Resp: 17 23 20 15   Temp:   98 °F (36.7 °C)    SpO2: 96% 97% 98% 99%   Weight:       Height:         Physical Exam  Vitals and nursing note reviewed. Constitutional:       General: He is not in acute distress. Appearance: He is well-developed. He is obese. He is ill-appearing. He is not toxic-appearing or diaphoretic. HENT:      Head: Normocephalic and atraumatic. Neck:      Thyroid: No thyromegaly. Vascular: No carotid bruit. Trachea: No tracheal deviation. Cardiovascular:      Rate and Rhythm: Normal rate and regular rhythm. Heart sounds: Normal heart sounds. No murmur heard. No friction rub. No gallop. Pulmonary:      Effort: Pulmonary effort is normal. No respiratory distress. Breath sounds: Normal breath sounds. No stridor. No wheezing or rales. Chest:      Chest wall: No tenderness. Abdominal:      General: There is no distension. Palpations: Abdomen is soft. There is no mass. Tenderness: There is no abdominal tenderness. There is no right CVA tenderness, left CVA tenderness, guarding or rebound. Musculoskeletal:         General: Normal range of motion. Cervical back: Normal range of motion and neck supple. Skin:     General: Skin is warm and dry. Coloration: Skin is not pale. Neurological:      Mental Status: He is alert and oriented to person, place, and time. Psychiatric:         Speech: Speech normal.         Behavior: Behavior normal.         Thought Content:  Thought content normal.         Judgment: Judgment normal.            Diagnostic Study Results     Labs -     Recent Results (from the past 12 hour(s)) METABOLIC PANEL, COMPREHENSIVE    Collection Time: 07/25/21 10:36 AM   Result Value Ref Range    Sodium 134 (L) 136 - 145 mmol/L    Potassium 3.5 3.5 - 5.5 mmol/L    Chloride 101 100 - 111 mmol/L    CO2 27 21 - 32 mmol/L    Anion gap 6 3.0 - 18 mmol/L    Glucose 182 (H) 74 - 99 mg/dL    BUN 14 7.0 - 18 MG/DL    Creatinine 1.38 (H) 0.6 - 1.3 MG/DL    BUN/Creatinine ratio 10 (L) 12 - 20      GFR est AA >60 >60 ml/min/1.73m2    GFR est non-AA 52 (L) >60 ml/min/1.73m2    Calcium 8.3 (L) 8.5 - 10.1 MG/DL    Bilirubin, total 1.1 (H) 0.2 - 1.0 MG/DL    ALT (SGPT) 57 16 - 61 U/L    AST (SGOT) 40 (H) 10 - 38 U/L    Alk. phosphatase 57 45 - 117 U/L    Protein, total 7.7 6.4 - 8.2 g/dL    Albumin 3.3 (L) 3.4 - 5.0 g/dL    Globulin 4.4 (H) 2.0 - 4.0 g/dL    A-G Ratio 0.8 0.8 - 1.7     CBC WITH AUTOMATED DIFF    Collection Time: 07/25/21 10:36 AM   Result Value Ref Range    WBC 13.9 (H) 4.6 - 13.2 K/uL    RBC 3.81 (L) 4.35 - 5.65 M/uL    HGB 11.0 (L) 13.0 - 16.0 g/dL    HCT 33.3 (L) 36.0 - 48.0 %    MCV 87.4 74.0 - 97.0 FL    MCH 28.9 24.0 - 34.0 PG    MCHC 33.0 31.0 - 37.0 g/dL    RDW 14.6 (H) 11.6 - 14.5 %    PLATELET 390 477 - 745 K/uL    MPV 10.4 9.2 - 11.8 FL    NEUTROPHILS 81 (H) 40 - 73 %    LYMPHOCYTES 8 (L) 21 - 52 %    MONOCYTES 7 3 - 10 %    EOSINOPHILS 0 0 - 5 %    BASOPHILS 0 0 - 2 %    ABS. NEUTROPHILS 11.3 (H) 1.8 - 8.0 K/UL    ABS. LYMPHOCYTES 1.1 0.9 - 3.6 K/UL    ABS. MONOCYTES 0.9 0.05 - 1.2 K/UL    ABS. EOSINOPHILS 0.0 0.0 - 0.4 K/UL    ABS.  BASOPHILS 0.0 0.0 - 0.1 K/UL    DF AUTOMATED     POC LACTIC ACID    Collection Time: 07/25/21 10:39 AM   Result Value Ref Range    Lactic Acid (POC) 1.52 0.40 - 2.00 mmol/L   URINALYSIS W/ RFLX MICROSCOPIC    Collection Time: 07/25/21 10:47 AM   Result Value Ref Range    Color YELLOW      Appearance CLEAR      Specific gravity 1.007 1.005 - 1.030      pH (UA) 5.5 5.0 - 8.0      Protein 30 (A) NEG mg/dL    Glucose Negative NEG mg/dL    Ketone Negative NEG mg/dL Bilirubin Negative NEG      Blood MODERATE (A) NEG      Urobilinogen 1.0 0.2 - 1.0 EU/dL    Nitrites Negative NEG      Leukocyte Esterase TRACE (A) NEG     URINE MICROSCOPIC ONLY    Collection Time: 07/25/21 10:47 AM   Result Value Ref Range    WBC 1 to 4 0 - 5 /hpf    RBC 1 to 4 0 - 5 /hpf    Epithelial cells FEW 0 - 5 /lpf    Bacteria 1+ (A) NEG /hpf   EKG, 12 LEAD, INITIAL    Collection Time: 07/25/21 11:33 AM   Result Value Ref Range    Ventricular Rate 98 BPM    Atrial Rate 98 BPM    P-R Interval 150 ms    QRS Duration 80 ms    Q-T Interval 338 ms    QTC Calculation (Bezet) 431 ms    Calculated P Axis 56 degrees    Calculated R Axis 30 degrees    Calculated T Axis 59 degrees    Diagnosis       Normal sinus rhythm  Nonspecific ST and T wave abnormality  Abnormal ECG  When compared with ECG of 24-JUL-2021 16:33,  No significant change was found  Confirmed by Lio Lord MD, ----- ([id number]) on 7/25/2021 2:22:59 PM         Radiologic Studies -   XR CHEST PORT   Final Result      No significant interval change compared to the radiograph from 7/24/2021 as   described. CT ABD PELV WO CONT   Final Result      No evidence of acute obstructive uropathy or of renal, ureteral or bladder   calculi. Mild diffuse bladder wall thickening and mild stranding in the pericystic fat,   new compared to CT from 2012, compatible with cystitis in the setting of known   urinary tract infection. Pronounced hepatic steatosis. Otherwise limited noncontrast CT scan with otherwise nonacute appearing findings   as above. CT Results  (Last 48 hours)               07/25/21 1125  CT ABD PELV WO CONT Final result    Impression:      No evidence of acute obstructive uropathy or of renal, ureteral or bladder   calculi.        Mild diffuse bladder wall thickening and mild stranding in the pericystic fat,   new compared to CT from 2012, compatible with cystitis in the setting of known   urinary tract infection. Pronounced hepatic steatosis. Otherwise limited noncontrast CT scan with otherwise nonacute appearing findings   as above. Narrative:  CT ABDOMEN AND PELVIS, WITHOUT IV CONTRAST       INDICATION: Above. Urinary tract infection. Clinical concern for urosepsis,   possible renal calculus. COMPARISON: Prior contrast-enhanced CT 8/12/2012       TECHNIQUE: Without administration of IV or oral contrast, helically acquired   axial CT images through the abdomen and pelvis were obtained. Additional   coronal and sagittal reformation images were also performed. All CT scans at this facility are performed using dose optimization technique as   appropriate to the performed exam, to include automated exposure control,   adjustment of the mA and/or kV according to patient's size (Including   appropriate matching for site-specific examinations), or use of iterative   reconstruction technique. FINDINGS:       Assessment of the solid and hollow viscera and vascular structures of the   abdomen and pelvis is limited by lack of contrast.       The visualized portions of lung bases demonstrate several small bilateral   chronic pulmonary nodules, the largest in the right middle lobe, without   interval enlargement since 2012. Given the chronic stability no CT follow-up is   indicated. No evidence of hydronephrosis/hydroureter. No evidence of nephrolithiasis or   urinary calculi. No significant stranding in the perinephric or periureteric   fat. Assessment of the renal parenchyma is otherwise limited by lack of IV   contrast without large contour deforming mass identified. Markedly decreased hepatic attenuation with some relative sparing in the   gallbladder fossa, with intrahepatic vessels hyperdense compared to hepatic   parenchyma despite lack of IV contrast, consistent with hepatic steatosis,   similar to prior.        Gallbladder appears partially contracted. The unenhanced spleen, pancreas, and adrenal glands appear grossly unremarkable   on noncontrast images. The stomach is poorly distended. The bowel is non-dilated without evidence of   bowel obstruction. The appendix appears unremarkable without evidence of   appendicitis. Scattered aortoiliac calcifications. No evidence of abdominal aortic aneurysm. No definite evidence of pathologic lymph node enlargement is seen. No definite fluid collection/abscess identified. There is mild stranding in the pericystic fat, new compared to CT from 2012. Mild diffuse bladder wall thickening compared to prior. Findings are suspicious   for cystitis. No gas in the bladder. Mild prostatic enlargement. On review of bone windows, no acute fractures or destructive bone lesions are   seen. CXR Results  (Last 48 hours)               07/25/21 1145  XR CHEST PORT Final result    Impression:      No significant interval change compared to the radiograph from 7/24/2021 as   described. Narrative:  AP CHEST, PORTABLE       INDICATION: Above. Meets SIRS criteria. COMPARISON:7 /24/2021. TECHNIQUE: Portable upright AP chest radiograph is reviewed. FINDINGS:       EKG leads/wires overlie the patient. Pulmonary vascular congestion with mild hazy indistinctness of the interstitial   markings similar to prior, possibly mild pulmonary edema. No evidence of focal   pulmonary consolidation. No evidence of pneumothorax. The costophrenic sulci   appear sharp. The cardiac silhouette is borderline enlarged for technique. 07/24/21 1734  XR CHEST PORT Final result    Impression:      Pulmonary vascular congestion with mild hazy indistinct prominence of the   interstitial markings, possibly mild pulmonary edema. Otherwise as above. Narrative:  AP CHEST, PORTABLE       INDICATION: Above.  Chills, fever, dysuria, sepsis evaluation. COMPARISON: None available. TECHNIQUE: Portable AP chest radiograph is reviewed. FINDINGS:       EKG leads/wires overlie the patient. Pulmonary vascular congestion with mild hazy indistinctness of the interstitial   markings, possibly mild pulmonary edema. No evidence of focal pulmonary   consolidation. No evidence of pneumothorax. The costophrenic sulci appear   sharp. The cardiac silhouette is top normal to borderline enlarged for technique. .                    Medical Decision Making   I am the first provider for this patient. I reviewed the vital signs, available nursing notes, past medical history, past surgical history, family history and social history. Vital Signs-Reviewed the patient's vital signs. Records Reviewed: Nursing Notes, Old Medical Records, Previous Radiology Studies and Previous Laboratory Studies    Procedures:  Procedures    Provider Notes (Medical Decision Making): Patient's white count is slightly increased. Still febrile and met sepsis criteria with known source of infection tachycardia and fever. Have admitted to the hospitalist after CT scan confirmed no obstructive uropathy or hydronephrosis. Given weight-based IV fluids and IV antibiotics and have consulted with Dr. Tamiko Bond for infectious disease who will be part of the treatment team.  Patient and wife updated he will be admitted. MED RECONCILIATION:  Current Facility-Administered Medications   Medication Dose Route Frequency    sodium chloride (NS) flush 5-10 mL  5-10 mL IntraVENous PRN    cefepime (MAXIPIME) 2 g in sterile water (preservative free) 10 mL IV syringe  2 g IntraVENous Q8H    levoFLOXacin (LEVAQUIN) 750 mg in D5W IVPB  750 mg IntraVENous Q24H     Current Outpatient Medications   Medication Sig    cephALEXin (Keflex) 500 mg capsule Take 1 Capsule by mouth four (4) times daily for 10 days.     glimepiride (AMARYL) 2 mg tablet     sildenafil citrate (VIAGRA) 100 mg tablet Take 1 Tab by mouth daily as needed (ED). Take 1/2 to 1 tablet as needed.  tamsulosin (FLOMAX) 0.4 mg capsule Take 1 Cap by mouth daily (after dinner).  OTHER daily. Indications: neutrogena testosterone booster    Cholecalciferol, Vitamin D3, 1,000 unit cap Take  by mouth.  hydrochlorothiazide (HYDRODIURIL) 25 mg tablet Take 25 mg by mouth daily.  sitaGLIPtin (JANUVIA) 50 mg tablet Take 50 mg by mouth daily.  amLODIPine-benazepril (LOTREL) 10-20 mg per capsule Take 1 Cap by mouth daily.  ketoconazole (NIZORAL) 2 % shampoo Apply  to affected area daily as needed for Itching.  budesonide-formoterol (SYMBICORT) 160-4.5 mcg/actuation HFA inhaler Take 2 Puffs by inhalation two (2) times a day. Disposition:  admit      Follow-up Information    None         Current Discharge Medication List            Core Measures:    Critical Care Time:   Critical Care Time:   I have spent 35 minutes of critical care time involved in lab review, consultations with specialist, family decision-making, and documentation. During this entire length of time I was immediately available to the patient.     Critical Care: The reason for providing this level of medical care for this critically ill patient was due a critical illness that impaired one or more vital organ systems such that there was a high probability of imminent or life threatening deterioration in the patients condition. This care involved high complexity decision making to assess, manipulate, and support vital system functions, to treat this degreee vital organ system failure and to prevent further life threatening deterioration of the patients condition. Diagnosis     Clinical Impression:   1. Sepsis, Gram negative ([facility] [address]    2. Bacteremia    3.  Acute UTI

## 2021-07-25 NOTE — CALL BACK NOTE
8: 37 AM  Received call from the lab. Patient has positive blood cultures growing out gram-negative rods and was discharged home on Keflex. Patient says he feels a little bit better than he did yesterday. Informed him of his test results and need for compliance with his medications however he said the pharmacy he uses was closed last night by the time he was discharged so he has not picked them up yet. Asked him to come in to be reevaluated since he has a considerable infection with the bacteremia. Patient says he will. - tyrone mir

## 2021-07-26 LAB
ANION GAP SERPL CALC-SCNC: 2 MMOL/L (ref 3–18)
BACTERIA SPEC CULT: NORMAL
BASOPHILS # BLD: 0 K/UL (ref 0–0.1)
BASOPHILS NFR BLD: 0 % (ref 0–2)
BUN SERPL-MCNC: 14 MG/DL (ref 7–18)
BUN/CREAT SERPL: 13 (ref 12–20)
CALCIUM SERPL-MCNC: 7.6 MG/DL (ref 8.5–10.1)
CHLORIDE SERPL-SCNC: 107 MMOL/L (ref 100–111)
CO2 SERPL-SCNC: 28 MMOL/L (ref 21–32)
CREAT SERPL-MCNC: 1.09 MG/DL (ref 0.6–1.3)
DIFFERENTIAL METHOD BLD: ABNORMAL
EOSINOPHIL # BLD: 0 K/UL (ref 0–0.4)
EOSINOPHIL NFR BLD: 0 % (ref 0–5)
ERYTHROCYTE [DISTWIDTH] IN BLOOD BY AUTOMATED COUNT: 15 % (ref 11.6–14.5)
EST. AVERAGE GLUCOSE BLD GHB EST-MCNC: 169 MG/DL
GLUCOSE BLD STRIP.AUTO-MCNC: 157 MG/DL (ref 70–110)
GLUCOSE BLD STRIP.AUTO-MCNC: 197 MG/DL (ref 70–110)
GLUCOSE BLD STRIP.AUTO-MCNC: 205 MG/DL (ref 70–110)
GLUCOSE BLD STRIP.AUTO-MCNC: 211 MG/DL (ref 70–110)
GLUCOSE SERPL-MCNC: 184 MG/DL (ref 74–99)
HBA1C MFR BLD: 7.5 % (ref 4.2–5.6)
HCT VFR BLD AUTO: 32.7 % (ref 36–48)
HGB BLD-MCNC: 10.6 G/DL (ref 13–16)
LYMPHOCYTES # BLD: 1 K/UL (ref 0.9–3.6)
LYMPHOCYTES NFR BLD: 10 % (ref 21–52)
MAGNESIUM SERPL-MCNC: 2.3 MG/DL (ref 1.6–2.6)
MCH RBC QN AUTO: 28.7 PG (ref 24–34)
MCHC RBC AUTO-ENTMCNC: 32.4 G/DL (ref 31–37)
MCV RBC AUTO: 88.6 FL (ref 74–97)
MONOCYTES # BLD: 0.6 K/UL (ref 0.05–1.2)
MONOCYTES NFR BLD: 6 % (ref 3–10)
NEUTS SEG # BLD: 8.7 K/UL (ref 1.8–8)
NEUTS SEG NFR BLD: 84 % (ref 40–73)
PLATELET # BLD AUTO: 133 K/UL (ref 135–420)
PMV BLD AUTO: 10.2 FL (ref 9.2–11.8)
POTASSIUM SERPL-SCNC: 3.7 MMOL/L (ref 3.5–5.5)
RBC # BLD AUTO: 3.69 M/UL (ref 4.35–5.65)
SERVICE CMNT-IMP: NORMAL
SODIUM SERPL-SCNC: 137 MMOL/L (ref 136–145)
WBC # BLD AUTO: 10.4 K/UL (ref 4.6–13.2)

## 2021-07-26 PROCEDURE — 74011000250 HC RX REV CODE- 250: Performed by: NURSE PRACTITIONER

## 2021-07-26 PROCEDURE — 82962 GLUCOSE BLOOD TEST: CPT

## 2021-07-26 PROCEDURE — 74011000250 HC RX REV CODE- 250: Performed by: INTERNAL MEDICINE

## 2021-07-26 PROCEDURE — 80048 BASIC METABOLIC PNL TOTAL CA: CPT

## 2021-07-26 PROCEDURE — 74011250637 HC RX REV CODE- 250/637: Performed by: INTERNAL MEDICINE

## 2021-07-26 PROCEDURE — 83036 HEMOGLOBIN GLYCOSYLATED A1C: CPT

## 2021-07-26 PROCEDURE — 83735 ASSAY OF MAGNESIUM: CPT

## 2021-07-26 PROCEDURE — 74011636637 HC RX REV CODE- 636/637: Performed by: INTERNAL MEDICINE

## 2021-07-26 PROCEDURE — 36415 COLL VENOUS BLD VENIPUNCTURE: CPT

## 2021-07-26 PROCEDURE — 74011250636 HC RX REV CODE- 250/636: Performed by: INTERNAL MEDICINE

## 2021-07-26 PROCEDURE — 94640 AIRWAY INHALATION TREATMENT: CPT

## 2021-07-26 PROCEDURE — 99232 SBSQ HOSP IP/OBS MODERATE 35: CPT | Performed by: INTERNAL MEDICINE

## 2021-07-26 PROCEDURE — 65270000029 HC RM PRIVATE

## 2021-07-26 PROCEDURE — 85025 COMPLETE CBC W/AUTO DIFF WBC: CPT

## 2021-07-26 PROCEDURE — 2709999900 HC NON-CHARGEABLE SUPPLY

## 2021-07-26 RX ORDER — MAG HYDROX/ALUMINUM HYD/SIMETH 200-200-20
30 SUSPENSION, ORAL (FINAL DOSE FORM) ORAL
Status: DISCONTINUED | OUTPATIENT
Start: 2021-07-26 | End: 2021-07-27

## 2021-07-26 RX ADMIN — CEFEPIME HYDROCHLORIDE 2 G: 2 INJECTION, POWDER, FOR SOLUTION INTRAVENOUS at 11:49

## 2021-07-26 RX ADMIN — INSULIN LISPRO 4 UNITS: 100 INJECTION, SOLUTION INTRAVENOUS; SUBCUTANEOUS at 11:56

## 2021-07-26 RX ADMIN — CEFEPIME HYDROCHLORIDE 2 G: 2 INJECTION, POWDER, FOR SOLUTION INTRAVENOUS at 20:47

## 2021-07-26 RX ADMIN — ARFORMOTEROL TARTRATE: 15 SOLUTION RESPIRATORY (INHALATION) at 08:00

## 2021-07-26 RX ADMIN — ACETAMINOPHEN 650 MG: 325 TABLET ORAL at 02:58

## 2021-07-26 RX ADMIN — TAMSULOSIN HYDROCHLORIDE 0.4 MG: 0.4 CAPSULE ORAL at 18:09

## 2021-07-26 RX ADMIN — Medication 10 ML: at 05:23

## 2021-07-26 RX ADMIN — CHOLECALCIFEROL TAB 25 MCG (1000 UNIT) 1000 UNITS: 25 TAB at 08:33

## 2021-07-26 RX ADMIN — INSULIN LISPRO 4 UNITS: 100 INJECTION, SOLUTION INTRAVENOUS; SUBCUTANEOUS at 17:16

## 2021-07-26 RX ADMIN — ARFORMOTEROL TARTRATE: 15 SOLUTION RESPIRATORY (INHALATION) at 21:25

## 2021-07-26 RX ADMIN — ACETAMINOPHEN 650 MG: 325 TABLET ORAL at 18:33

## 2021-07-26 RX ADMIN — INSULIN GLARGINE 5 UNITS: 100 INJECTION, SOLUTION SUBCUTANEOUS at 22:40

## 2021-07-26 RX ADMIN — CEFEPIME HYDROCHLORIDE 2 G: 2 INJECTION, POWDER, FOR SOLUTION INTRAVENOUS at 02:48

## 2021-07-26 RX ADMIN — ACETAMINOPHEN 650 MG: 325 TABLET ORAL at 12:30

## 2021-07-26 RX ADMIN — SODIUM CHLORIDE 75 ML/HR: 900 INJECTION, SOLUTION INTRAVENOUS at 05:22

## 2021-07-26 RX ADMIN — HEPARIN SODIUM 5000 UNITS: 5000 INJECTION INTRAVENOUS; SUBCUTANEOUS at 08:41

## 2021-07-26 RX ADMIN — INSULIN LISPRO 3 UNITS: 100 INJECTION, SOLUTION INTRAVENOUS; SUBCUTANEOUS at 08:35

## 2021-07-26 RX ADMIN — AMLODIPINE BESYLATE 10 MG: 10 TABLET ORAL at 08:33

## 2021-07-26 RX ADMIN — INSULIN LISPRO 2 UNITS: 100 INJECTION, SOLUTION INTRAVENOUS; SUBCUTANEOUS at 22:41

## 2021-07-26 RX ADMIN — ALBUTEROL SULFATE 2.5 MG: 2.5 SOLUTION RESPIRATORY (INHALATION) at 13:23

## 2021-07-26 NOTE — DIABETES MGMT
Diabetes Patient/Family Education Record    Factors That May Influence Patients Ability to Learn or Comply with Recommendations   []   Language barrier    []   Cultural needs   []   Motivation    []   Cognitive limitation    []   Physical   []   Education    []   Physiological factors   []   Hearing/vision/speaking impairment   []   Orthodox beliefs    []   Financial factors   []  Other:   [x]  No factors identified at this time. Person Instructed:   [x]   Patient   [x]   Family - wife at bedside    []  Other     Preference for Learning:   [x]   Verbal   [x]   Written   []  Demonstration     Level of Comprehension & Competence:    [x]  Good                                      [] Fair                                     []  Poor                             []  Needs Reinforcement   [x]  Teach back completed - monitoring post-prandial BG    Education Component:  See DM note - he was switched to night shift at work in April - and experienced difficulty managing meals, BG monitoring and medications. Attributes this to rise in A1c.   Now back on day shift and better able to manage    [x]  Medication management, including how to administer insulin (if appropriate) and potential medication interactions    [x]  Nutritional management - [x] Obtained usual meal pattern   []   Basic carbohydrate counting  []  Plate method  []  Limit concentrated sweets and avoid sweetened beverages  []  Portion control  []    Avoid skipping meals  Provided printed materials for DM meal planning and portions    [x]  Exercise   [x]  Signs, symptoms, and treatment of hyperglycemia and hypoglycemia   [] Prevention, recognition and treatment of hyperglycemia and hypoglycemia   []  Importance of blood glucose monitoring  [] Blood Glucose targets   []   Provided patient with blood glucose meter  [x]  Has glucometer and supplies at home          - reviewed BG targets and encouraged him to monitor post-prandial   []  Instruction on use of the blood glucose meter and recommended monitoring schedule   [x]  Discuss the importance of HbA1C monitoring. Patients A1c is 7.5 %.  This is equivalent to average glucose of  _169_ mg/dl for the past 2-3 months.   []  Sick day guidelines   []  Proper use and disposal of lancets, needles, syringes or insulin pens (if appropriate)   [x]  Potential long-term complications (retinopathy, kidney disease, neuropathy, foot care)   [x] Information about whom to contact in case of emergency or for more information  To f/u with PCP   [x]  Goal:  Patient/family will demonstrate understanding of Diabetes Self- Management Skills by: (date) _______  Plan for post-discharge education or self-management support:    [] Outpatient class schedule provided            [] Patient Declined    [] Scheduled for outpatient classes (date) _______    [x] Written information provided  Verify: [x] Prior to admission Diabetes medications    Does patient understand how diabetes medications work? __________yes__________________  Does patient have difficulty obtaining diabetes medications or testing supplies? _______no________       Baldemar Wahl MPH RN Viviana Belcher  Pager [phone]  Office [phone]

## 2021-07-26 NOTE — PROGRESS NOTES
Bedside shift change report given to Jeffrey Barrett RN (oncoming nurse) by Júnior Morgan RN (offgoing nurse). Report included the following information SBAR, Kardex, Intake/Output, MAR and Cardiac Rhythm SV Rhythm.

## 2021-07-26 NOTE — PROGRESS NOTES
Problem: Falls - Risk of  Goal: *Absence of Falls  Description: Document Star Arzate Fall Risk and appropriate interventions in the flowsheet. Outcome: Progressing Towards Goal  Note: Fall Risk Interventions:            Medication Interventions: Patient to call before getting OOB         History of Falls Interventions: Consult care management for discharge planning         Problem: Patient Education: Go to Patient Education Activity  Goal: Patient/Family Education  Outcome: Progressing Towards Goal     Problem: General Infection Care Plan (Adult and Pediatric)  Goal: Improvement in signs and symptoms of infection  Outcome: Progressing Towards Goal  Goal: *Optimize nutritional status  Outcome: Progressing Towards Goal     Problem: Patient Education: Go to Patient Education Activity  Goal: Patient/Family Education  Outcome: Progressing Towards Goal     Problem: Impaired Skin Integrity/Pressure Injury Treatment  Goal: *Improvement of Existing Pressure Injury  Outcome: Progressing Towards Goal  Goal: *Prevention of pressure injury  Description: Document Carlos Scale and appropriate interventions in the flowsheet. Outcome: Progressing Towards Goal  Note: Pressure Injury Interventions:             Activity Interventions: Increase time out of bed, Pressure redistribution bed/mattress(bed type)         Nutrition Interventions: Document food/fluid/supplement intake                     Problem: Sepsis: Day of Diagnosis  Goal: Off Pathway (Use only if patient is Off Pathway)  Outcome: Progressing Towards Goal  Goal: *Fluid resuscitation  Outcome: Progressing Towards Goal  Goal: *Paired blood cultures prior to first dose of antibiotic  Outcome: Progressing Towards Goal  Goal: *First dose of  appropriate antibiotic within 3 hours of arrival to ED, within 1 hour of arrival to ICU  Outcome: Progressing Towards Goal  Goal: *Lactic acid with first set of blood cultures  Outcome: Progressing Towards Goal  Goal: *Pneumococcal immunization (if eligible)  Outcome: Progressing Towards Goal  Goal: *Influenza immunization (if eligible)  Outcome: Progressing Towards Goal  Goal: Activity/Safety  Outcome: Progressing Towards Goal  Goal: Consults, if ordered  Outcome: Progressing Towards Goal  Goal: Diagnostic Test/Procedures  Outcome: Progressing Towards Goal  Goal: Nutrition/Diet  Outcome: Progressing Towards Goal  Goal: Discharge Planning  Outcome: Progressing Towards Goal  Goal: Medications  Outcome: Progressing Towards Goal  Goal: Respiratory  Outcome: Progressing Towards Goal  Goal: Treatments/Interventions/Procedures  Outcome: Progressing Towards Goal  Goal: Psychosocial  Outcome: Progressing Towards Goal

## 2021-07-26 NOTE — PROGRESS NOTES
conducted an initial consultation and Spiritual Assessment for Declan Mccabe, who is a 60 y.o.,male. Patients Primary Language is: [place]. According to the patients EMR Scientologist Affiliation is: Ramírez Barba. The reason the Patient came to the hospital is:   Patient Active Problem List    Diagnosis Date Noted    Sepsis, Gram negative ([facility] [address] 07/25/2021    Acute UTI 07/25/2021        The  provided the following Interventions:  Initiated a relationship of care and support. Guests and wife were present in the room upon my visit. Explored issues of kailee, belief, spirituality and Druze/ritual needs while hospitalized. Listened empathically. Patient shared how thankful for listening to his wife's prompting to take a break from work and have his health checked up. 'It could have gotten worse. ..thank God! \"  Provided chaplaincy education. Provided information about Spiritual Care Services. Offered  assurance of continued prayers on patient's behalf. Chart reviewed. The following outcomes where achieved:  Patient shared limited information about both his medical narrative and spiritual journey/beliefs.  confirmed Patient's Scientologist Affiliation. Patient processed feeling about current hospitalization. Patient expressed gratitude for 's visit. Assessment:  Patient does not have any Druze/cultural needs that will affect patients preferences in health care. There are no spiritual or Druze issues which require intervention at this time. Plan:  Chaplains will continue to follow and will provide pastoral care on an as needed/requested basis.  recommends bedside caregivers page  on duty if patient shows signs of acute spiritual or emotional distress.     Uriah Norwood 605 [phone]

## 2021-07-26 NOTE — PROGRESS NOTES
Problem: Falls - Risk of  Goal: *Absence of Falls  Description: Document Elizabeth Alamo Fall Risk and appropriate interventions in the flowsheet.   Outcome: Progressing Towards Goal  Note: Fall Risk Interventions:            Medication Interventions: Teach patient to arise slowly         History of Falls Interventions: Assess for delayed presentation/identification of injury for 48 hrs (comment for end date)

## 2021-07-26 NOTE — PROGRESS NOTES
Problem: Falls - Risk of  Goal: *Absence of Falls  Description: Document Elizabeth Alamo Fall Risk and appropriate interventions in the flowsheet.   7/26/2021 1838 by Jarod Jc RN  Outcome: Progressing Towards Goal  Note: Fall Risk Interventions:            Medication Interventions: Teach patient to arise slowly         History of Falls Interventions: Assess for delayed presentation/identification of injury for 48 hrs (comment for end date)      7/26/2021 1515 by Jarod Jc RN  Outcome: Progressing Towards Goal  Note: Fall Risk Interventions:            Medication Interventions: Teach patient to arise slowly         History of Falls Interventions: Assess for delayed presentation/identification of injury for 48 hrs (comment for end date)

## 2021-07-26 NOTE — CONSULTS
[facility]  (A Division of [address] [place] [address])      Consultation Note      Date of Admission: 7/25/2021    Date of Note: 7/26/2021    My partner Dr. Yvon Shepherd will be seeing the patient for the rest of this week. Reason for Referral: bacteremic UTI      Current Antimicrobials:    Prior Antimicrobials:  Cefepime 7/25 - 1  levofloxacin 7/25 - 0       Assessment: Rec / Plan:   UTI   - complicated by h/o BPH w/ LUTS, recent dc flomax  - urgency, dysuria x 1 week PTA, fever chills 1 day PTA -> continue cefepime for now but likely switch to po quinolone soon if GNB is susceptible. BSI- LF GNR  - 2 of 2 blcx 7/24 + Lactose Fermenting GNB  - from above  - rpt blcx 7/25 NGTD x 2 -> abx as above   Sepsis  - fever, leukocytosis present on admission  - due to above  - improving on current abx -> abx as above   BPH w/ LUTS  - stopped taking Flomax 2 months PTA - felt he was taking too many meds -> follow up with urology as OP   DM -> per IM   HTN    ED    HBV carrier    Obesity BMI 38.9          Microbiology:      Lines / Catheters:      HPI:  60year-old Obese -American male w/ h/o DM, HTN BPH w/ LUTS, ED admitted to [facility] 7/25/2021 with positive blood cultures . He was previously on Flomax but decided on his own to stop taking this 2 months PTA because he was taking too many medications. Around 1 week PTA he began having urinary urgency and burning dysuria followed by fever and uncontrollable shaking chills He presented tot he [facility] ED 7/24 with fever of 102.5 and urinalysis showed 30-40 WBC but was deemed safe for discharge home on Keflex. Covid 19 rapid test was negative. He was not able to fill the prescription for Keflexs but was called back for admission 7/25 due to two of two blood cultures from 7/24 growing gram-negative bacilli. Temperature on admission is 101.2 and WBC count is 13.9.   No urine culture had been sent on 7/24 and one was sent 7/25 on antibiotic therapy. He was given one dose of levofloxacin then started on Cefepime. He is symptomatically improved with no more dysuria, fever or chills. Active Hospital Problems    Diagnosis Date Noted    Sepsis, Gram negative ([facility] [address] 2021    Acute UTI 2021     Past Medical History:   Diagnosis Date    Aortic insufficiency     Chronic airway obstruction (HCC)     Diabetes mellitus (HCC)     Erectile dysfunction     Feeling of incomplete bladder emptying     Hepatitis B carrier (HCC)     Hyperglycemia     Hypertension     Mitral regurgitation     trace    Onychomycosis     Polyp of colon     Stiff joint     Stomach pain     Tobacco abuse disorder     Urinary urgency     Vitamin D deficiency      Past Surgical History:   Procedure Laterality Date    HX COLONOSCOPY       Family History   Problem Relation Age of Onset    Heart Disease Mother     Other Brother         Prostate problem     Other Brother      Social History     Socioeconomic History    Marital status:      Spouse name: Not on file    Number of children: Not on file    Years of education: Not on file    Highest education level: Not on file   Occupational History    Not on file   Tobacco Use    Smoking status: Former Smoker     Packs/day: 1.00     Years: 30.00     Pack years: 30.00     Types: Cigarettes     Quit date: 2009     Years since quittin.8    Smokeless tobacco: Never Used   Substance and Sexual Activity    Alcohol use:  Yes     Alcohol/week: 0.0 standard drinks     Comment: occassionaly    Drug use: No    Sexual activity: Yes     Partners: Female   Other Topics Concern    Not on file   Social History Narrative    Not on file     Social Determinants of Health     Financial Resource Strain:     Difficulty of Paying Living Expenses:    Food Insecurity:     Worried About Running Out of Food in the Last Year:     [address] in the Last Year:    Transportation Needs:     Lack of Transportation (Medical):  Lack of Transportation (Non-Medical):    Physical Activity:     Days of Exercise per Week:     Minutes of Exercise per Session:    Stress:     Feeling of Stress :    Social Connections:     Frequency of Communication with Friends and Family:     Frequency of Social Gatherings with Friends and Family:     Attends Scientologist Services:     Active Member of Clubs or Organizations:     Attends Club or Organization Meetings:     Marital Status:    Intimate Partner Violence:     Fear of Current or Ex-Partner:     Emotionally Abused:     Physically Abused:     Sexually Abused:         Allergies:  Metformin     Medications:  Current Facility-Administered Medications   Medication Dose Route Frequency    cefepime (MAXIPIME) 2 g in sterile water (preservative free) 10 mL IV syringe  2 g IntraVENous Q8H    sodium chloride (NS) flush 5-10 mL  5-10 mL IntraVENous PRN    acetaminophen (TYLENOL) tablet 650 mg  650 mg Oral Q6H PRN    ondansetron (ZOFRAN) injection 4 mg  4 mg IntraVENous Q6H PRN    heparin (porcine) injection 5,000 Units  5,000 Units SubCUTAneous Q8H    insulin lispro (HUMALOG) injection   SubCUTAneous AC&HS    glucose chewable tablet 16 g  16 g Oral PRN    glucagon (GLUCAGEN) injection 1 mg  1 mg IntraMUSCular PRN    dextrose (D50W) injection syrg 12.5-25 g  25-50 mL IntraVENous PRN    arformoterol 15 mcg/budesonide 0.5 mg neb solution   Nebulization BID RT    cholecalciferol (VITAMIN D3) (1000 Units /25 mcg) tablet 1,000 Units  1,000 Units Oral DAILY    tamsulosin (FLOMAX) capsule 0.4 mg  0.4 mg Oral PCD    insulin glargine (LANTUS) injection 5 Units  5 Units SubCUTAneous QHS    amLODIPine (NORVASC) tablet 10 mg  10 mg Oral DAILY    hydrALAZINE (APRESOLINE) tablet 10 mg  10 mg Oral Q8H PRN    albuterol (PROVENTIL VENTOLIN) nebulizer solution 2.5 mg  2.5 mg Nebulization Q6H PRN        ROS:  A comprehensive review of systems was negative except for that written in the History of Present Illness. Physical Exam:    Temp (24hrs), Av.5 °F (36.9 °C), Min:98 °F (36.7 °C), Max:99 °F (37.2 °C)    Visit Vitals  BP (!) 157/67 (BP 1 Location: Right upper arm, BP Patient Position: At rest)   Pulse 87   Temp 98.9 °F (37.2 °C)   Resp 18   Ht 6' (1.829 m)   Wt 130.1 kg (286 lb 14.4 oz)   SpO2 97%   BMI 38.91 kg/m²       General: Well developed,obese  60 y.o. BLACK/ male in no acute distress. ENT: ENT exam normal, no neck nodes or sinus tenderness  Head: normocephalic, without obvious abnormality  Mouth:  mucous membranes moist, pharynx normal without lesions  Neck: supple, symmetrical, trachea midline   Cardio:  regular rate and rhythm, S1, S2 normal, no murmur, click, rub or gallop  Chest: inspection normal - no chest wall deformities or tenderness, respiratory effort normal  Lungs: clear to auscultation, no wheezes or rales and unlabored breathing  Abdomen: soft, distended, non-tender. Bowel sounds normal. No masses, no organomegaly. tympanitic  Extremities:  no redness or tenderness in the calves or thighs, no edema  Neuro: Grossly normal       Lab results:    Chemistry  Recent Labs     21  1036 21  1620   * 182* 188*    134* 136   K 3.7 3.5 3.9    101 101   CO2 28 27 30   BUN 14 14 19*   CREA 1.09 1.38* 1.35*   CA 7.6* 8.3* 8.8   AGAP 2* 6 5   BUCR 13 10* 14   AP  --  57  --    TP  --  7.7  --    ALB  --  3.3*  --    GLOB  --  4.4*  --    AGRAT  --  0.8  --        CBC w/ Diff  Recent Labs     21  1036 21  1620   WBC 10.4 13.9* 11.9   RBC 3.69* 3.81* 4.17*   HGB 10.6* 11.0* 12.2*   HCT 32.7* 33.3* 36.8   * 146 183   GRANS 84* 81* 86*   LYMPH 10* 8* 8*   EOS 0 0 0       Microbiology  All Micro Results     Procedure Component Value Units Date/Time    CULTURE, BLOOD [[id number]] Collected: 21 1020    Order Status: Completed Specimen: Blood Updated: 21 0711     Special Requests: NO SPECIAL REQUESTS        Culture result: NO GROWTH AFTER 19 HOURS       CULTURE, BLOOD [[id number]] Collected: 07/25/21 1036    Order Status: Completed Specimen: Blood Updated: 07/26/21 0711     Special Requests: NO SPECIAL REQUESTS        Culture result: NO GROWTH AFTER 19 HOURS       CULTURE, URINE [[id number]] Collected: 07/25/21 1047    Order Status: Completed Specimen: Urine from Clean catch Updated: 07/25/21 2025             Shama Boyd MD, [facility]  7/26/2021   10:26 AM

## 2021-07-26 NOTE — PROGRESS NOTES
conducted a Follow up consultation and Spiritual Assessment for Mindy Holly, who is a 60 y.o.,male. The  provided the following Interventions:  Continued the relationship of care and support. Listened empathically. Offered prayer and assurance of continued prayer on patients behalf. Chart reviewed. The following outcomes were achieved:  Patient expressed gratitude for 's visit. Assessment:  There are no further spiritual or Restoration issues which require Spiritual Care Services interventions at this time. Plan:  Chaplains will continue to follow and will provide pastoral care on an as needed/requested basis.     [facility] [address]   [phone]

## 2021-07-26 NOTE — PROGRESS NOTES
Preliminary review. Will see patient tomorrow. 60year-old -American male w/ h/o BPH w/ LUTS, ED presented to [facility] ED 7/24/2021 with general unwellness, lethargy, weakness, shaking chills and sweats and some dysuria . He did have a fever of 102.5 and urinalysis showed 30-40 WBC but was deemed safe for discharge home on Keflex. Covid 19 rapid test was negative. He had symptomatic improvement but was called back for admission 7/25 due to two of two blood cultures from 7/24 growing gram-negative bacilli. Temperature on admission is 101.2 and WBC count is 13.9. No urine culture had been sent on 7/24 and one has been sent today 7/25 on antibiotic therapy.   He has been started on Cefepime and Levofloxacin    Bacteremic UTI w/ GNR  Sepsis (fever, leukocytosis present on admission)  H/o BPH w/ LUTS (consider prostatitis)  ED    Continue Cefepime, Levofloxacin pending GNB identification and sensitivities but streamline ASAP    Meg Monroe MD, [facility]

## 2021-07-26 NOTE — PROGRESS NOTES
[facility]ist Group  Progress Note    Patient: Khris White Age: 60 y.o. : 1955 MR#: [id number] SSN: [id number]  Date/Time: 2021    Subjective:     Pt has no specific complaints. Seen w/ wife at bedside. Assessment/Plan:   Patient is a 61-year-old male with history of benign prostatic hypertrophy, diabetes mellitus, recently evaluated in the emergency room for chills and discharged with a diagnosis of urinary tract infection on Keflex who has been admitted after being called back for positive blood cultures.     -Urinary tract infection  -No evidence of acute obstructive uropathy on CT imaging  -Benign prostatic hypertrophy  -Gram-negative bacteremia  -Sepsis POA due to urinary source infection  -STACIE: creat improved    HISTORY OF:  -Type 2 diabetes mellitus: A1c -->7.5, on Lantus 5 units, blood sugars are within acceptable limits  -Hepatitis B carrier  -Hypertension  -    PLAN:  -Antibiotics per ID  -Continue current insulin regimen  -Continue to monitor urine and blood cultures.  -Dispo hopefully home on oral antibiotics after finalization of blood culture results and identification and sensitivity of gram-negative bacteria obtained  -Continue to monitor renal function, avoid nephrotoxic drugs  Additional Notes:      Case discussed with:  [x]Patient  [x]Family  []Nursing  []Case Management  DVT Prophylaxis:  []Lovenox  [x]Hep SQ  []SCDs  []Coumadin   []On Heparin gtt    Objective:   VS:   Visit Vitals  /77   Pulse 93   Temp 98.1 °F (36.7 °C)   Resp 18   Ht 6' (1.829 m)   Wt 130.1 kg (286 lb 14.4 oz)   SpO2 95%   BMI 38.91 kg/m²      Tmax/24hrs: Temp (24hrs), Av.6 °F (37 °C), Min:98.1 °F (36.7 °C), Max:99.1 °F (37.3 °C)    Input/Output:     Intake/Output Summary (Last 24 hours) at 2021 1426  Last data filed at 2021 0526  Gross per 24 hour   Intake    Output 400 ml   Net -400 ml       General:  Alert, awake, in NAD  Cardiovascular:  RRR, no murmurs  Pulmonary:  CTAB  GI: abd soft, nt, nd   Extremities:  No edema  Additional:      Labs:    Recent Results (from the past 24 hour(s))   GLUCOSE, POC    Collection Time: 07/25/21  4:32 PM   Result Value Ref Range    Glucose (POC) 190 (H) 70 - 110 mg/dL   GLUCOSE, POC    Collection Time: 07/25/21  9:26 PM   Result Value Ref Range    Glucose (POC) 156 (H) 70 - 110 mg/dL   CBC WITH AUTOMATED DIFF    Collection Time: 07/26/21  2:46 AM   Result Value Ref Range    WBC 10.4 4.6 - 13.2 K/uL    RBC 3.69 (L) 4.35 - 5.65 M/uL    HGB 10.6 (L) 13.0 - 16.0 g/dL    HCT 32.7 (L) 36.0 - 48.0 %    MCV 88.6 74.0 - 97.0 FL    MCH 28.7 24.0 - 34.0 PG    MCHC 32.4 31.0 - 37.0 g/dL    RDW 15.0 (H) 11.6 - 14.5 %    PLATELET 616 (L) 865 - 420 K/uL    MPV 10.2 9.2 - 11.8 FL    NEUTROPHILS 84 (H) 40 - 73 %    LYMPHOCYTES 10 (L) 21 - 52 %    MONOCYTES 6 3 - 10 %    EOSINOPHILS 0 0 - 5 %    BASOPHILS 0 0 - 2 %    ABS. NEUTROPHILS 8.7 (H) 1.8 - 8.0 K/UL    ABS. LYMPHOCYTES 1.0 0.9 - 3.6 K/UL    ABS. MONOCYTES 0.6 0.05 - 1.2 K/UL    ABS. EOSINOPHILS 0.0 0.0 - 0.4 K/UL    ABS.  BASOPHILS 0.0 0.0 - 0.1 K/UL    DF AUTOMATED     METABOLIC PANEL, BASIC    Collection Time: 07/26/21  2:46 AM   Result Value Ref Range    Sodium 137 136 - 145 mmol/L    Potassium 3.7 3.5 - 5.5 mmol/L    Chloride 107 100 - 111 mmol/L    CO2 28 21 - 32 mmol/L    Anion gap 2 (L) 3.0 - 18 mmol/L    Glucose 184 (H) 74 - 99 mg/dL    BUN 14 7.0 - 18 MG/DL    Creatinine 1.09 0.6 - 1.3 MG/DL    BUN/Creatinine ratio 13 12 - 20      GFR est AA >60 >60 ml/min/1.73m2    GFR est non-AA >60 >60 ml/min/1.73m2    Calcium 7.6 (L) 8.5 - 10.1 MG/DL   MAGNESIUM    Collection Time: 07/26/21  2:46 AM   Result Value Ref Range    Magnesium 2.3 1.6 - 2.6 mg/dL   HEMOGLOBIN A1C WITH EAG    Collection Time: 07/26/21  2:46 AM   Result Value Ref Range    Hemoglobin A1c 7.5 (H) 4.2 - 5.6 %    Est. average glucose 169 mg/dL   GLUCOSE, POC    Collection Time: 07/26/21  8:28 AM   Result Value Ref Range    Glucose (POC) 157 (H) 70 - 110 mg/dL   GLUCOSE, POC    Collection Time: 07/26/21 11:56 AM   Result Value Ref Range    Glucose (POC) 211 (H) 70 - 110 mg/dL     Additional Data Reviewed:      Signed By: Hemalatha Tan MD     July 26, 2021 2:26 PM

## 2021-07-26 NOTE — DIABETES MGMT
Diabetes Plan of Care    Assessment:  Admitted with UTI - consult received   Wife at bedside   Patient confirms his home DM medications   Monitors BG daily   Does not consume sugary beverages -   Reviewed BG monitoring and targets for post-prandial   Encouraged him to monitor 2 hours after a meal and adjust meals as needed  Printed materials provided   Will follow     Most recent blood glucose values:       Within target range     No    Current A1c  Lab Results   Component Value Date/Time    Hemoglobin A1c 7.5 (H) 07/26/2021 02:46 AM     Adequate glycemic control PTA:  No     Current hospital diabetes medications:  Lantus 5 units daily  Corrective lispro, normal insulin sensitivity, 4 times daily     Diet -       TDD previous day = 9 units   5 units lantus  4 units lispro     Home diabetes medications;  Glimepiride 2 mg daily  sitagliptin 50 mg daily     Goals: Blood glucose will be within target of 70 - 180 mg/dl by - 7/30/2021    Education:  Reviewed DM meal planning and portions - printed material provided    Sherri Jackson MPH RN [facility]  Pager [phone]  Office [phone]

## 2021-07-27 VITALS
WEIGHT: 292.8 LBS | BODY MASS INDEX: 39.66 KG/M2 | HEART RATE: 79 BPM | OXYGEN SATURATION: 98 % | RESPIRATION RATE: 20 BRPM | DIASTOLIC BLOOD PRESSURE: 66 MMHG | TEMPERATURE: 99.5 F | HEIGHT: 72 IN | SYSTOLIC BLOOD PRESSURE: 148 MMHG

## 2021-07-27 LAB
BACTERIA SPEC CULT: ABNORMAL
GLUCOSE BLD STRIP.AUTO-MCNC: 129 MG/DL (ref 70–110)
GLUCOSE BLD STRIP.AUTO-MCNC: 161 MG/DL (ref 70–110)
GRAM STN SPEC: ABNORMAL
SERVICE CMNT-IMP: ABNORMAL
SERVICE CMNT-IMP: ABNORMAL

## 2021-07-27 PROCEDURE — 94640 AIRWAY INHALATION TREATMENT: CPT

## 2021-07-27 PROCEDURE — 74011250637 HC RX REV CODE- 250/637: Performed by: INTERNAL MEDICINE

## 2021-07-27 PROCEDURE — 82962 GLUCOSE BLOOD TEST: CPT

## 2021-07-27 PROCEDURE — 94760 N-INVAS EAR/PLS OXIMETRY 1: CPT

## 2021-07-27 PROCEDURE — 74011636637 HC RX REV CODE- 636/637: Performed by: INTERNAL MEDICINE

## 2021-07-27 PROCEDURE — 74011250636 HC RX REV CODE- 250/636: Performed by: INTERNAL MEDICINE

## 2021-07-27 PROCEDURE — 74011000250 HC RX REV CODE- 250: Performed by: INTERNAL MEDICINE

## 2021-07-27 PROCEDURE — 99239 HOSP IP/OBS DSCHRG MGMT >30: CPT | Performed by: INTERNAL MEDICINE

## 2021-07-27 RX ORDER — SAME BUTANEDISULFONATE/BETAINE 400-600 MG
250 POWDER IN PACKET (EA) ORAL 2 TIMES DAILY
Qty: 36 CAPSULE | Refills: 0 | Status: SHIPPED | OUTPATIENT
Start: 2021-07-27 | End: 2021-08-14

## 2021-07-27 RX ORDER — CIPROFLOXACIN 500 MG/1
500 TABLET ORAL 2 TIMES DAILY
Qty: 26 TABLET | Refills: 0 | Status: SHIPPED | OUTPATIENT
Start: 2021-07-27 | End: 2021-08-09

## 2021-07-27 RX ORDER — CIPROFLOXACIN 500 MG/1
500 TABLET ORAL EVERY 12 HOURS
Status: DISCONTINUED | OUTPATIENT
Start: 2021-07-27 | End: 2021-07-27 | Stop reason: HOSPADM

## 2021-07-27 RX ADMIN — CIPROFLOXACIN 500 MG: 500 TABLET, FILM COATED ORAL at 12:41

## 2021-07-27 RX ADMIN — ARFORMOTEROL TARTRATE: 15 SOLUTION RESPIRATORY (INHALATION) at 10:03

## 2021-07-27 RX ADMIN — CHOLECALCIFEROL TAB 25 MCG (1000 UNIT) 1000 UNITS: 25 TAB at 09:08

## 2021-07-27 RX ADMIN — AMLODIPINE BESYLATE 10 MG: 10 TABLET ORAL at 09:08

## 2021-07-27 RX ADMIN — CEFEPIME HYDROCHLORIDE 2 G: 2 INJECTION, POWDER, FOR SOLUTION INTRAVENOUS at 03:25

## 2021-07-27 RX ADMIN — INSULIN LISPRO 2 UNITS: 100 INJECTION, SOLUTION INTRAVENOUS; SUBCUTANEOUS at 10:58

## 2021-07-27 RX ADMIN — ACETAMINOPHEN 650 MG: 325 TABLET ORAL at 03:33

## 2021-07-27 RX ADMIN — CEFEPIME HYDROCHLORIDE 2 G: 2 INJECTION, POWDER, FOR SOLUTION INTRAVENOUS at 10:58

## 2021-07-27 NOTE — DIABETES MGMT
GLYCEMIC CONTROL PLAN OF CARE    Assessment:  History:  T2DM  Morning blood glucose:  129 mg/dl  IVF containing dextrose:  None   Steroids:  None   Diet/TF:  Regular, consistent carb  Recommendations:  Blood glucose within target range this am.  Continue lantus dose as ordered  Advanced corrective insulin coverage to very insulin resistant dosing  Will continue inpatient monitoring. Most recent BG values:      Results for Marc Candelario (MRN [id number]) as of 7/27/2021 15:44   Ref. Range 7/26/2021 11:56 7/26/2021 17:14 7/26/2021 21:30 7/27/2021 08:08 7/27/2021 10:57   GLUCOSE,FAST - POC Latest Ref Range: 70 - 110 mg/dL 211 (H) 205 (H) 197 (H) 129 (H) 161 (H)     Within target range  mg/dl? Progressing towards goal.  Current A1C:  7.5% equivalent to an average blood glucose of 169 mg/dl over the past 2-3 months. Adequate glycemic control PTA? Marginal   Current hospital diabetes medications:  lantus 5 units every bedtime  Lispro corrective insulin coverage AC&HS  Previous days insulin requirements:  lantus 5 units  Lispro 13 units corrective insulin  Home diabetes medication:  Glimepiride 2 mg daily  sitagliptin 50 mg daily   Education:  _x_ see diabetes education record (7/26/2021)            ___ diabetes education not indicated at this time.     [facility] RN CDE  Ext [phone]

## 2021-07-27 NOTE — PROGRESS NOTES
D/C order noted for today. Orders reviewed. CM spoke with patient and his wife at the bedside, patient's wife will be transporting patient home today at time of discharge. No needs identified at this time. CM remains available if needed.            Ruma Lawton, ADILIA [phone]

## 2021-07-27 NOTE — PROGRESS NOTES
Problem: Falls - Risk of  Goal: *Absence of Falls  Description: Document Elizabeth Alamo Fall Risk and appropriate interventions in the flowsheet. Outcome: Progressing Towards Goal  Note: Fall Risk Interventions:            Medication Interventions: Patient to call before getting OOB         History of Falls Interventions: Consult care management for discharge planning         Problem: Patient Education: Go to Patient Education Activity  Goal: Patient/Family Education  Outcome: Progressing Towards Goal     Problem: General Infection Care Plan (Adult and Pediatric)  Goal: Improvement in signs and symptoms of infection  Outcome: Progressing Towards Goal  Goal: *Optimize nutritional status  Outcome: Progressing Towards Goal     Problem: Impaired Skin Integrity/Pressure Injury Treatment  Goal: *Improvement of Existing Pressure Injury  Outcome: Progressing Towards Goal  Goal: *Prevention of pressure injury  Description: Document Carlos Scale and appropriate interventions in the flowsheet. Outcome: Progressing Towards Goal  Note: Pressure Injury Interventions:             Activity Interventions: Pressure redistribution bed/mattress(bed type), Increase time out of bed         Nutrition Interventions: Document food/fluid/supplement intake                     Problem: Patient Education: Go to Patient Education Activity  Goal: Patient/Family Education  Outcome: Progressing Towards Goal     Problem: Patient Education: Go to Patient Education Activity  Goal: Patient/Family Education  Outcome: Progressing Towards Goal     Problem: Sepsis: Day of Diagnosis  Goal: Off Pathway (Use only if patient is Off Pathway)  Outcome: Progressing Towards Goal  Goal: *Fluid resuscitation  Outcome: Progressing Towards Goal  Goal: *Paired blood cultures prior to first dose of antibiotic  Outcome: Progressing Towards Goal  Goal: *First dose of  appropriate antibiotic within 3 hours of arrival to ED, within 1 hour of arrival to ICU  Outcome: Progressing Towards Goal  Goal: *Lactic acid with first set of blood cultures  Outcome: Progressing Towards Goal  Goal: *Pneumococcal immunization (if eligible)  Outcome: Progressing Towards Goal  Goal: *Influenza immunization (if eligible)  Outcome: Progressing Towards Goal  Goal: Activity/Safety  Outcome: Progressing Towards Goal  Goal: Consults, if ordered  Outcome: Progressing Towards Goal  Goal: Diagnostic Test/Procedures  Outcome: Progressing Towards Goal  Goal: Nutrition/Diet  Outcome: Progressing Towards Goal  Goal: Discharge Planning  Outcome: Progressing Towards Goal  Goal: Medications  Outcome: Progressing Towards Goal  Goal: Respiratory  Outcome: Progressing Towards Goal  Goal: Treatments/Interventions/Procedures  Outcome: Progressing Towards Goal  Goal: Psychosocial  Outcome: Progressing Towards Goal

## 2021-07-27 NOTE — ACP (ADVANCE CARE PLANNING)
Advance Care Planning     General Advance Care Planning (ACP) Conversation      Date of Conversation: 07/27/2021  Conducted with: Patient with Decision Making Capacity    Healthcare Decision Maker:     Click here to complete [employer] including selection of the Healthcare Decision Maker Relationship (ie \"Primary\")  Today we documented Decision Maker(s). The patient will provide ACP documents. Content/Action Overview:   Patient said he has ACP document at home, and will bring in hospital the next time, he is here.         Length of Voluntary ACP Conversation in minutes:  [phone] [facility]

## 2021-07-27 NOTE — PROGRESS NOTES
Reason for Admission:  Sepsis, Gram negative ([facility] [address] [A41.50]  Acute UTI [N39.0]                 RUR Score:    13%            Plan for utilizing home health:    No, not at this time. Patient is ambulatory and self-care. Likelihood of Readmission:   LOW                         Transition of Care Plan:              Initial assessment completed with patient and spouse/SO. Cognitive status of patient: oriented to time, place, person and situation. Face sheet information confirmed:  yes. The patient designates his wife Jayde Kumar [phone] to participate in his discharge plan and to receive any needed information. This patient lives in a single family home with his wife, with no steps to enter. Patient is able to navigate steps as needed. Prior to hospitalization, patient was considered to be independent with ADLs/IADLS : yes . Patient has a current ACP document on file: no.      Healthcare Decision Maker:     Click here to complete [address] including selection of the Healthcare Decision Maker Relationship (ie \"Primary\")    The patient's wife will be available to transport patient home upon discharge. The patient already has none reported,  medical equipment available in the home. Patient is not currently active with home health. Patient has not stayed in a skilled nursing facility or rehab. This patient is on dialysis :no.    Currently, the discharge plan is Home with Family Assistance. The patient states that he can obtain his medications from the pharmacy, and take his medications as directed. Patient's current insurance is  [employer]. Care Management Interventions  PCP Verified by CM:  Yes  Mode of Transport at Discharge: Self (Patient's wife will be transporting patient home at time of discharge. )  Transition of Care Consult (CM Consult): Discharge Planning  Discharge Durable Medical Equipment: No  Physical Therapy Consult: No  Occupational Therapy Consult: No  Speech Therapy Consult: No  Current Support Network: Lives with Spouse  Confirm Follow Up Transport: Family  Discharge Location  Discharge Placement: Home with family assistance        Clayton Tenorio RN  Case Management [phone]

## 2021-07-27 NOTE — DISCHARGE SUMMARY
[facility]  Discharge Summary       Patient: Moris Bhatti Age: 60 y.o. : 1955 MR#: [id number] SSN: [id number]  PCP on record: Marion Dutton MD  Admit date: 2021  Discharge date: 2021    Consults:  -ALBIN Archibald,-ID  -   Procedures: None  -     Significant Diagnostic Studies: -CT abd pelv wo cont 21:  No evidence of acute obstructive uropathy or of renal, ureteral or bladder  calculi.     Mild diffuse bladder wall thickening and mild stranding in the pericystic fat,  new compared to CT from , compatible with cystitis in the setting of known  urinary tract infection.     Pronounced hepatic steatosis.     Otherwise limited noncontrast CT scan with otherwise nonacute appearing findings  as above. -CXR 21:  IMPRESSION     No significant interval change compared to the radiograph from 2021 as  described. Discharge Diagnoses:  -UTI  -E.coli bacteremia  -Sepsis POA    -STACIE                                      Patient Active Problem List   Diagnosis Code    Sepsis, Gram negative ([facility] [address] A41.50    Acute UTI N39.0       Hospital Course by Problem     Patient is a 78-year-old male with history of benign prostatic hypertrophy, diabetes mellitus, recently evaluated in the emergency room for chills and discharged with a diagnosis of urinary tract infection on Keflex who has been admitted after being called back for positive blood cultures. The bacteria in his blood was identified as E. Coli that is pansensitive. He was evaluated by the infectious disease specialist and recommendations made for patient to be discharged on a 14-day course of Cipro. Patient remained stable throughout the entire course of his stay.   He was afebrile and did not exhibit any signs or symptoms of ongoing sepsis.     -Urinary tract infection  -No evidence of acute obstructive uropathy on CT imaging  -Benign prostatic hypertrophy  -E.coli bacteremia  -Sepsis POA due to urinary source infection  -STACIE: creat improved     HISTORY OF:  -Type 2 diabetes mellitus: A1c -->7.5, was on Lantus 5 units, blood sugars are within acceptable limits  -Hepatitis B carrier: No active issues  -Hypertension no active issues          Today's examination of the patient revealed:     Subjective:   Date of discharge patient had no complaints. He denied nausea vomiting. Objective:   VS:   Visit Vitals  BP (!) 148/66   Pulse 79   Temp 99.5 °F (37.5 °C)   Resp 20   Ht 6' (1.829 m)   Wt 132.8 kg (292 lb 12.8 oz)   SpO2 98%   BMI 39.71 kg/m²      Tmax/24hrs: Temp (24hrs), Av.2 °F (37.3 °C), Min:98.1 °F (36.7 °C), Max:100.2 °F (37.9 °C)     Input/Output: No intake or output data in the 24 hours ending 21 1442    General:  Alert, awake, in NAD  Cardiovascular:  RRR, no murmurs  Pulmonary:  CTAB  GI: abd soft, nt, nd   Extremities:  No edema  Additional:      Labs:    Recent Results (from the past 24 hour(s))   GLUCOSE, POC    Collection Time: 21  5:14 PM   Result Value Ref Range    Glucose (POC) 205 (H) 70 - 110 mg/dL   GLUCOSE, POC    Collection Time: 21  9:30 PM   Result Value Ref Range    Glucose (POC) 197 (H) 70 - 110 mg/dL   GLUCOSE, POC    Collection Time: 21  8:08 AM   Result Value Ref Range    Glucose (POC) 129 (H) 70 - 110 mg/dL   GLUCOSE, POC    Collection Time: 21 10:57 AM   Result Value Ref Range    Glucose (POC) 161 (H) 70 - 110 mg/dL     Additional Data Reviewed:     Condition on discharge:stable   Disposition:    [x]Home   []Home with Home Health   []SNF/NH   []Rehab   []Home with family   []Alternate Facility:____________________      Discharge Medications:     Current Discharge Medication List      START taking these medications    Details   ciprofloxacin HCl (CIPRO) 500 mg tablet Take 1 Tablet by mouth two (2) times a day for 13 days.   Qty: 26 Tablet, Refills: 0      Saccharomyces boulardii (Florastor) 250 mg capsule Take 1 Capsule by mouth two (2) times a day for 18 days. Qty: 36 Capsule, Refills: 0         CONTINUE these medications which have NOT CHANGED    Details   glimepiride (AMARYL) 2 mg tablet       sildenafil citrate (VIAGRA) 100 mg tablet Take 1 Tab by mouth daily as needed (ED). Take 1/2 to 1 tablet as needed. Qty: 10 Tab, Refills: 6      tamsulosin (FLOMAX) 0.4 mg capsule Take 1 Cap by mouth daily (after dinner). Qty: 90 Cap, Refills: 3      Cholecalciferol, Vitamin D3, 1,000 unit cap Take  by mouth. hydrochlorothiazide (HYDRODIURIL) 25 mg tablet Take 25 mg by mouth daily. sitaGLIPtin (JANUVIA) 50 mg tablet Take 50 mg by mouth daily. budesonide-formoterol (SYMBICORT) 160-4.5 mcg/actuation HFA inhaler Take 2 Puffs by inhalation two (2) times a day. OTHER daily. Indications: neutrogena testosterone booster      ketoconazole (NIZORAL) 2 % shampoo Apply  to affected area daily as needed for Itching. STOP taking these medications       amLODIPine-benazepril (LOTREL) 10-20 mg per capsule Comments:   Reason for Stopping:         cephALEXin (Keflex) 500 mg capsule Comments:   Reason for Stopping: Follow-up Appointments:   1.  Your PCP: Chloe Callahan MD, within 7-10days      >30 minutes spent coordinating this discharge (review instructions/follow-up, prescriptions, preparing report for sign off)    Signed:  Val Silva MD  7/27/2021  2:42 PM

## 2021-07-27 NOTE — PROGRESS NOTES
Patient discharged into care of wife to home. Provided with copy of AVS and reviewed discharge instructions with wife and patient. Questions encouraged. Patient transported to front door via wheelchair with personal belongings.

## 2021-07-27 NOTE — PROGRESS NOTES
[facility]  (A Division of [address] [place] [address])    Sanjuana iSlva MD  [facility])  Office #:     [phone]   Office Fax: [phone]     Date of Admission: 7/25/2021    Date of Note: 7/27/2021    Reason for Referral: bacteremic UTI      Current Antimicrobials:    Prior Antimicrobials:  Cefepime 7/25 - 1  levofloxacin 7/25 - 0       Assessment: Rec / Plan:   UTI   - complicated by h/o BPH w/ LUTS, recent dc flomax  - urgency, dysuria x 1 week PTA, fever chills 1 day PTA -> dc cefepime  -> oral Cipro 500 mg PO BID to complete 14 days duration total on DC     BSI- LF E.coli- PanSS  - 2 of 2 blcx 7/24 + Lactose Fermenting GNB  - from above  - rpt blcx 7/25 NGTD x 2 -> abx as above   Sepsis  - fever, leukocytosis present on admission  - due to above  - improving on current abx -> abx as above   BPH w/ LUTS  - stopped taking Flomax 2 months PTA - felt he was taking too many meds -> follow up with urology as OP   DM -> per IM   HTN    ED    HBV carrier    Obesity BMI 38.9      Condition: stable    Subjective:\" I feel fine\" wife present during interview    Night sweats. No fever or chills. Voiding ok. No N/V/abd or flank pain. Notes and labs /Micro reviewed  Called [facility] for SS  Microbiology results reviewed- [facility] called for further clarification as indicated         HPI/Summary:  60year-old Obese -American male w/ h/o DM, HTN BPH w/ LUTS, ED admitted to [facility] 7/25/2021 with positive blood cultures . He was previously on Flomax but decided on his own to stop taking this 2 months PTA because he was taking too many medications. Around 1 week PTA he began having urinary urgency and burning dysuria followed by fever and uncontrollable shaking chills He presented tot he [facility] ED 7/24 with fever of 102.5 and urinalysis showed 30-40 WBC but was deemed safe for discharge home on Keflex.  Covid 19 rapid test was negative. He was not able to fill the prescription for Keflexs but was called back for admission  due to two of two blood cultures from  growing gram-negative bacilli. Temperature on admission is 101.2 and WBC count is 13.9. No urine culture had been sent on  and one was sent  on antibiotic therapy. He was given one dose of levofloxacin then started on Cefepime. He is symptomatically improved with no more dysuria, fever or chills. Active Hospital Problems    Diagnosis Date Noted    Sepsis, Gram negative ([address] 2021    Acute UTI 2021     Past Medical History:   Diagnosis Date    Aortic insufficiency     Chronic airway obstruction (HCC)     Diabetes mellitus (HCC)     Erectile dysfunction     Feeling of incomplete bladder emptying     Hepatitis B carrier (HCC)     Hyperglycemia     Hypertension     Mitral regurgitation     trace    Onychomycosis     Polyp of colon     Stiff joint     Stomach pain     Tobacco abuse disorder     Urinary urgency     Vitamin D deficiency      Past Surgical History:   Procedure Laterality Date    HX COLONOSCOPY       Family History   Problem Relation Age of Onset    Heart Disease Mother     Other Brother         Prostate problem     Other Brother      Social History     Socioeconomic History    Marital status:      Spouse name: Not on file    Number of children: Not on file    Years of education: Not on file    Highest education level: Not on file   Occupational History    Not on file   Tobacco Use    Smoking status: Former Smoker     Packs/day: 1.00     Years: 30.00     Pack years: 30.00     Types: Cigarettes     Quit date: 2009     Years since quittin.8    Smokeless tobacco: Never Used   Substance and Sexual Activity    Alcohol use:  Yes     Alcohol/week: 0.0 standard drinks     Comment: occassionaly    Drug use: No    Sexual activity: Yes     Partners: Female   Other Topics Concern    Not on file Social History Narrative    Not on file     Social Determinants of Health     Financial Resource Strain:     Difficulty of Paying Living Expenses:    Food Insecurity:     Worried About Running Out of Food in the Last Year:     [address] in the Last Year:    Transportation Needs:     Lack of Transportation (Medical):  Lack of Transportation (Non-Medical):    Physical Activity:     Days of Exercise per Week:     Minutes of Exercise per Session:    Stress:     Feeling of Stress :    Social Connections:     Frequency of Communication with Friends and Family:     Frequency of Social Gatherings with Friends and Family:     Attends Yazidism Services:     Active Member of Clubs or Organizations:     Attends Club or Organization Meetings:     Marital Status:    Intimate Partner Violence:     Fear of Current or Ex-Partner:     Emotionally Abused:     Physically Abused:     Sexually Abused:         Allergies:  Metformin     Medications:  Current Facility-Administered Medications   Medication Dose Route Frequency    cefepime (MAXIPIME) 2 g in sterile water (preservative free) 10 mL IV syringe  2 g IntraVENous Q8H    alum-mag hydroxide-simeth (MYLANTA) oral suspension 30 mL  30 mL Oral Q6H PRN    sodium chloride (NS) flush 5-10 mL  5-10 mL IntraVENous PRN    acetaminophen (TYLENOL) tablet 650 mg  650 mg Oral Q6H PRN    ondansetron (ZOFRAN) injection 4 mg  4 mg IntraVENous Q6H PRN    heparin (porcine) injection 5,000 Units  5,000 Units SubCUTAneous Q8H    insulin lispro (HUMALOG) injection   SubCUTAneous AC&HS    glucose chewable tablet 16 g  16 g Oral PRN    glucagon (GLUCAGEN) injection 1 mg  1 mg IntraMUSCular PRN    dextrose (D50W) injection syrg 12.5-25 g  25-50 mL IntraVENous PRN    arformoterol 15 mcg/budesonide 0.5 mg neb solution   Nebulization BID RT    cholecalciferol (VITAMIN D3) (1000 Units /25 mcg) tablet 1,000 Units  1,000 Units Oral DAILY    tamsulosin (FLOMAX) capsule 0.4 mg  0.4 mg Oral PCD    insulin glargine (LANTUS) injection 5 Units  5 Units SubCUTAneous QHS    amLODIPine (NORVASC) tablet 10 mg  10 mg Oral DAILY    hydrALAZINE (APRESOLINE) tablet 10 mg  10 mg Oral Q8H PRN    albuterol (PROVENTIL VENTOLIN) nebulizer solution 2.5 mg  2.5 mg Nebulization Q6H PRN        ROS:  A comprehensive review of systems was negative except for that written in the History of Present Illness. Physical Exam:    Temp (24hrs), Av °F (37.2 °C), Min:98.1 °F (36.7 °C), Max:100.2 °F (37.9 °C)    Visit Vitals  BP (!) 164/86   Pulse 83   Temp 98.1 °F (36.7 °C)   Resp 18   Ht 6' (1.829 m)   Wt 132.8 kg (292 lb 12.8 oz)   SpO2 96%   BMI 39.71 kg/m²       General: Well developed,obese  60 y.o. BLACK/ male in no acute distress. ENT: ENT exam normal, no neck nodes or sinus tenderness  Head: normocephalic, without obvious abnormality  Mouth:  mucous membranes moist, pharynx normal without lesions  Neck: supple, symmetrical, trachea midline   CVS: RRR  Chest: inspection normal - no chest wall deformities or tenderness, respiratory effort normal  Abdomen: soft, distended, non-tender. Bowel sounds normal. No masses, no organomegaly. tympanitic  Extremities:  no redness or tenderness in the calves or thighs, no edema  Neuro: Grossly normal       Lab results:  Recent Results (from the past 24 hour(s))   GLUCOSE, POC    Collection Time: 21 11:56 AM   Result Value Ref Range    Glucose (POC) 211 (H) 70 - 110 mg/dL   GLUCOSE, POC    Collection Time: 21  5:14 PM   Result Value Ref Range    Glucose (POC) 205 (H) 70 - 110 mg/dL   GLUCOSE, POC    Collection Time: 21  9:30 PM   Result Value Ref Range    Glucose (POC) 197 (H) 70 - 110 mg/dL   GLUCOSE, POC    Collection Time: 21  8:08 AM   Result Value Ref Range    Glucose (POC) 129 (H) 70 - 110 mg/dL       Chemistry  Recent Labs     21  0246 21  1036 21  1620   * 182* 188*    134* 136 K 3.7 3.5 3.9    101 101   CO2 28 27 30   BUN 14 14 19*   CREA 1.09 1.38* 1.35*   CA 7.6* 8.3* 8.8   AGAP 2* 6 5   BUCR 13 10* 14   AP  --  57  --    TP  --  7.7  --    ALB  --  3.3*  --    GLOB  --  4.4*  --    AGRAT  --  0.8  --        CBC w/ Diff  Recent Labs     21  0246 21  1036 21  1620   WBC 10.4 13.9* 11.9   RBC 3.69* 3.81* 4.17*   HGB 10.6* 11.0* 12.2*   HCT 32.7* 33.3* 36.8   * 146 183   GRANS 84* 81* 86*   LYMPH 10* 8* 8*   EOS 0 0 0       Microbiology  All Micro Results     Procedure Component Value Units Date/Time    CULTURE, BLOOD [[id number]] Collected: 21 1020    Order Status: Completed Specimen: Blood Updated: 2135     Special Requests: NO SPECIAL REQUESTS        Culture result: NO GROWTH 2 DAYS       CULTURE, BLOOD [[id number]] Collected: 21 1036    Order Status: Completed Specimen: Blood Updated: 2135     Special Requests: NO SPECIAL REQUESTS        Culture result: NO GROWTH 2 DAYS       CULTURE, URINE [[id number]] Collected: 21 1047    Order Status: Completed Specimen: Urine from Clean catch Updated: 21 1440     Special Requests: NO SPECIAL REQUESTS        Culture result: No growth (<1,000 CFU/ML)              Imagin/25 CT AP w/o contrast    Microbiology:  Blood culture:   - 3/4- + LF GNR  - NGSF    Urine:  - UA with TNTC wbc- no culture  - UA wbc 1-4, Culture no growht     CT Imaging:       No evidence of acute obstructive uropathy or of renal, ureteral or bladder  calculi.     Mild diffuse bladder wall thickening and mild stranding in the pericystic fat,  new compared to CT from , compatible with cystitis in the setting of known  urinary tract infection.     Pronounced hepatic steatosis.     Otherwise limited noncontrast CT scan with otherwise nonacute appearing findings  as above.

## 2021-07-27 NOTE — DISCHARGE INSTRUCTIONS
Patient Education        Sepsis: Care Instructions  Overview     Sepsis is an intense reaction to an infection. It can cause damage to the body and lead to dangerously low blood pressure. You may have inflammation across large areas of your body. It can damage tissue and even go deep into your organs. Infections that can lead to sepsis include:  · A skin infection such as from a cut. · A lung infection like pneumonia. · A kidney infection. · A gut infection such as E. coli. Sepsis is treated with antibiotics. Your doctor will try to find the infection that led to sepsis. Roma Valentine also get fluids through a vein (IV). Machines will track your vital signs, including temperature, blood pressure, breathing rate, and pulse rate. The physical and mental effects of sepsis may not be seen for several weeks after treatment. And they may last long after the infection is gone. Physical problems may include:  · Feeling weak and tired. · Feeling out of breath. · Aches and pains. · Problems with getting around. · Trouble falling asleep or staying asleep. · Dry and itchy skin, brittle nails, and hair loss. Some of these effects can lead to problems with your organs or your feet, legs, hands, or arms. Sepsis can also affect your mind and emotions. Problems may include:  · Self-doubt. · Anxiety. · Nightmares. · Depression and mood problems. · Wanting to avoid other people. · Confusion. · Flashbacks and bad memories of your illness. It's important to care for yourself and try to avoid infections. This may lower your risk of getting sepsis again. Follow-up care is a key part of your treatment and safety. Be sure to make and go to all appointments, and call your doctor if you are having problems. It's also a good idea to know your test results and keep a list of the medicines you take. How can you care for yourself at home? · Be safe with medicines. Take your medicines exactly as prescribed.  Call your doctor if you think you are having a problem with your medicine. · If your doctor prescribed antibiotics, take them as directed. Do not stop taking them just because you feel better. You need to take the full course of antibiotics. · Help prevent infections that could again lead to sepsis. ? Try to avoid colds and flu. If you must be around people who have a cold or the flu, wash your hands often. And get a flu vaccine every year. ? Ask your doctor if you need a pneumococcal vaccine (to prevent pneumonia, meningitis, and other infections). If you have had one before, ask your doctor if you need another dose. ? Clean any wounds or scrapes. · Do not smoke or use other tobacco products. When you quit smoking, you are less likely to get a cold, the flu, bronchitis, and pneumonia. If you need help quitting, talk to your doctor about stop-smoking programs and medicines. These can increase your chances of quitting for good. · Drink plenty of fluids to prevent dehydration. Choose water and other caffeine-free clear liquids until you feel better. If you have kidney, heart, or liver disease and have to limit fluids, talk with your doctor before you increase the amount of fluids you drink. · Eat a healthy diet. Include fruits, vegetables, and whole grains in your diet every day. · If your doctor recommends it, try doing some physical activity. Walking is a good choice. Bit by bit, increase the amount you walk every day. · Talk with your family and friends about your challenges. Ask for help if you need it. · Keep a journal. Writing down your thoughts and feelings can help reduce your stress. · Ask family members to fill in gaps in your memory. · Set small goals for yourself that you can reach. Reward yourself for success. When should you call for help? Call  911 anytime you think you may need emergency care. For example, call if:    · You passed out (lost consciousness).    Call your doctor now or seek immediate medical care if:    · You have symptoms such as:  ? Shortness of breath. ? Feeling very sick. ? Severe pain. ? A fast heart rate. ? Cool, pale, or clammy skin. ? Feeling confused. ? Feeling very sleepy, or you are hard to wake up.     · You are dizzy or lightheaded, or you feel like you may faint.     · You have a fever or chills. Watch closely for changes in your health, and be sure to contact your doctor if:    · You do not get better as expected. Where can you learn more? Go to http://www.gray.com/  Enter T383 in the search box to learn more about \"Sepsis: Care Instructions. \"  Current as of: September 23, 2020               Content Version: 12.8  © 2006-2021 [employer]. Care instructions adapted under license by [employer] (which disclaims liability or warranty for this information). If you have questions about a medical condition or this instruction, always ask your healthcare professional. [place] [address] any warranty or liability for your use of this information. DISCHARGE SUMMARY from Nurse    PATIENT INSTRUCTIONS:    After general anesthesia or intravenous sedation, for 24 hours or while taking prescription Narcotics:  · Limit your activities  · Do not drive and operate hazardous machinery  · Do not make important personal or business decisions  · Do  not drink alcoholic beverages  · If you have not urinated within 8 hours after discharge, please contact your surgeon on call.     Report the following to your surgeon:  · Excessive pain, swelling, redness or odor of or around the surgical area  · Temperature over 100.5  · Nausea and vomiting lasting longer than 4 hours or if unable to take medications  · Any signs of decreased circulation or nerve impairment to extremity: change in color, persistent  numbness, tingling, coldness or increase pain  · Any questions    What to do at Home:  Recommended activity: Activity as tolerated    If you experience any of the following symptoms fever greater than 100.5F, increased pain, worsening shortness of breath that does not resolve with rest, please follow up with your primary care physician. *  Please give a list of your current medications to your Primary Care Provider. *  Please update this list whenever your medications are discontinued, doses are      changed, or new medications (including over-the-counter products) are added. *  Please carry medication information at all times in case of emergency situations. These are general instructions for a healthy lifestyle:    No smoking/ No tobacco products/ Avoid exposure to second hand smoke  Surgeon General's Warning:  Quitting smoking now greatly reduces serious risk to your health. Obesity, smoking, and sedentary lifestyle greatly increases your risk for illness    A healthy diet, regular physical exercise & weight monitoring are important for maintaining a healthy lifestyle    You may be retaining fluid if you have a history of heart failure or if you experience any of the following symptoms:  Weight gain of 3 pounds or more overnight or 5 pounds in a week, increased swelling in our hands or feet or shortness of breath while lying flat in bed. Please call your doctor as soon as you notice any of these symptoms; do not wait until your next office visit. The discharge information has been reviewed with the patient. The patient verbalized understanding. Discharge medications reviewed with the patient and appropriate educational materials and side effects teaching were provided.   ___________________________________________________________________________________________________________________________________

## 2021-07-27 NOTE — ROUTINE PROCESS
Bedside shift change report given to James Valencia RN (oncoming nurse) by Bettye Britt RN (offgoing nurse). Report included the following information SBAR, Kardex, Intake/Output and MAR.

## 2021-07-31 LAB
BACTERIA SPEC CULT: NORMAL
BACTERIA SPEC CULT: NORMAL
SERVICE CMNT-IMP: NORMAL
SERVICE CMNT-IMP: NORMAL

## 2022-11-12 NOTE — ROUTINE PROCESS
Bedside shift change report given to William Porras RN (oncoming nurse) by Noemi Harris RN (offgoing nurse). Report included the following information SBAR, Kardex, Intake/Output and MAR. Yes

## 2022-12-17 ENCOUNTER — APPOINTMENT (OUTPATIENT)
Dept: GENERAL RADIOLOGY | Age: 67
End: 2022-12-17
Attending: EMERGENCY MEDICINE
Payer: COMMERCIAL

## 2022-12-17 ENCOUNTER — HOSPITAL ENCOUNTER (EMERGENCY)
Age: 67
Discharge: HOME | End: 2022-12-17
Attending: EMERGENCY MEDICINE
Payer: COMMERCIAL

## 2022-12-17 VITALS
OXYGEN SATURATION: 96 % | HEART RATE: 103 BPM | WEIGHT: 270 LBS | DIASTOLIC BLOOD PRESSURE: 79 MMHG | SYSTOLIC BLOOD PRESSURE: 138 MMHG | RESPIRATION RATE: 16 BRPM | BODY MASS INDEX: 36.57 KG/M2 | TEMPERATURE: 100 F | HEIGHT: 72 IN

## 2022-12-17 DIAGNOSIS — J10.1 INFLUENZA A: Primary | ICD-10-CM

## 2022-12-17 LAB
ALBUMIN SERPL-MCNC: 3.9 G/DL (ref 3.4–5)
ALBUMIN/GLOB SERPL: 0.9 {RATIO} (ref 0.8–1.7)
ALP SERPL-CCNC: 51 U/L (ref 45–117)
ALT SERPL-CCNC: 40 U/L (ref 16–61)
ANION GAP SERPL CALC-SCNC: 8 MMOL/L (ref 3–18)
AST SERPL-CCNC: 24 U/L (ref 10–38)
ATRIAL RATE: 109 BPM
BASOPHILS # BLD: 0 K/UL (ref 0–0.1)
BASOPHILS NFR BLD: 0 % (ref 0–2)
BILIRUB SERPL-MCNC: 0.7 MG/DL (ref 0.2–1)
BUN SERPL-MCNC: 14 MG/DL (ref 7–18)
BUN/CREAT SERPL: 10 (ref 12–20)
CALCIUM SERPL-MCNC: 9 MG/DL (ref 8.5–10.1)
CALCULATED P AXIS, ECG09: 67 DEGREES
CALCULATED R AXIS, ECG10: 42 DEGREES
CALCULATED T AXIS, ECG11: 80 DEGREES
CHLORIDE SERPL-SCNC: 96 MMOL/L (ref 100–111)
CO2 SERPL-SCNC: 30 MMOL/L (ref 21–32)
CREAT SERPL-MCNC: 1.41 MG/DL (ref 0.6–1.3)
DIAGNOSIS, 93000: NORMAL
DIFFERENTIAL METHOD BLD: ABNORMAL
EOSINOPHIL # BLD: 0 K/UL (ref 0–0.4)
EOSINOPHIL NFR BLD: 0 % (ref 0–5)
ERYTHROCYTE [DISTWIDTH] IN BLOOD BY AUTOMATED COUNT: 13.9 % (ref 11.6–14.5)
FLUAV RNA SPEC QL NAA+PROBE: DETECTED
FLUBV RNA SPEC QL NAA+PROBE: NOT DETECTED
GLOBULIN SER CALC-MCNC: 4.3 G/DL (ref 2–4)
GLUCOSE SERPL-MCNC: 226 MG/DL (ref 74–99)
HCT VFR BLD AUTO: 40.3 % (ref 36–48)
HGB BLD-MCNC: 13.4 G/DL (ref 13–16)
IMM GRANULOCYTES # BLD AUTO: 0 K/UL (ref 0–0.04)
IMM GRANULOCYTES NFR BLD AUTO: 0 % (ref 0–0.5)
LYMPHOCYTES # BLD: 0.7 K/UL (ref 0.9–3.6)
LYMPHOCYTES NFR BLD: 7 % (ref 21–52)
MCH RBC QN AUTO: 29.7 PG (ref 24–34)
MCHC RBC AUTO-ENTMCNC: 33.3 G/DL (ref 31–37)
MCV RBC AUTO: 89.4 FL (ref 78–100)
MONOCYTES # BLD: 0.7 K/UL (ref 0.05–1.2)
MONOCYTES NFR BLD: 8 % (ref 3–10)
NEUTS SEG # BLD: 7.7 K/UL (ref 1.8–8)
NEUTS SEG NFR BLD: 84 % (ref 40–73)
NRBC # BLD: 0 K/UL (ref 0–0.01)
NRBC BLD-RTO: 0 PER 100 WBC
P-R INTERVAL, ECG05: 130 MS
PLATELET # BLD AUTO: 179 K/UL (ref 135–420)
PMV BLD AUTO: 10 FL (ref 9.2–11.8)
POTASSIUM SERPL-SCNC: 3.4 MMOL/L (ref 3.5–5.5)
PROT SERPL-MCNC: 8.2 G/DL (ref 6.4–8.2)
Q-T INTERVAL, ECG07: 328 MS
QRS DURATION, ECG06: 76 MS
QTC CALCULATION (BEZET), ECG08: 441 MS
RBC # BLD AUTO: 4.51 M/UL (ref 4.35–5.65)
SARS-COV-2, COV2: NOT DETECTED
SODIUM SERPL-SCNC: 134 MMOL/L (ref 136–145)
TROPONIN-HIGH SENSITIVITY: 12 NG/L (ref 0–78)
VENTRICULAR RATE, ECG03: 109 BPM
WBC # BLD AUTO: 9.2 K/UL (ref 4.6–13.2)

## 2022-12-17 PROCEDURE — 71045 X-RAY EXAM CHEST 1 VIEW: CPT

## 2022-12-17 PROCEDURE — 80053 COMPREHEN METABOLIC PANEL: CPT

## 2022-12-17 PROCEDURE — 87636 SARSCOV2 & INF A&B AMP PRB: CPT

## 2022-12-17 PROCEDURE — 99285 EMERGENCY DEPT VISIT HI MDM: CPT

## 2022-12-17 PROCEDURE — 74011250636 HC RX REV CODE- 250/636: Performed by: EMERGENCY MEDICINE

## 2022-12-17 PROCEDURE — 84484 ASSAY OF TROPONIN QUANT: CPT

## 2022-12-17 PROCEDURE — 85025 COMPLETE CBC W/AUTO DIFF WBC: CPT

## 2022-12-17 PROCEDURE — 74011250637 HC RX REV CODE- 250/637: Performed by: EMERGENCY MEDICINE

## 2022-12-17 PROCEDURE — 93005 ELECTROCARDIOGRAM TRACING: CPT

## 2022-12-17 RX ORDER — ACETAMINOPHEN 500 MG
1000 TABLET ORAL ONCE
Status: COMPLETED | OUTPATIENT
Start: 2022-12-17 | End: 2022-12-17

## 2022-12-17 RX ADMIN — ACETAMINOPHEN 1000 MG: 500 TABLET ORAL at 12:18

## 2022-12-17 RX ADMIN — SODIUM CHLORIDE 1000 ML: 9 INJECTION, SOLUTION INTRAVENOUS at 12:19

## 2022-12-17 NOTE — ED PROVIDER NOTES
EMERGENCY DEPARTMENT HISTORY AND PHYSICAL EXAM    Date: 12/17/2022  Patient Name: Randell Klein    History of Presenting Illness     Chief Complaint   Patient presents with    Cough    Fever         History Provided By:     Chief Complaint:  Duration:   Timing:    Location:   Quality:   Severity:   Modifying Factors:   Associated Symptoms: none       Additional History (Context): Randell Klein is a 78 y.o. male with a history of PE who presents to the emerged part with cough, congestion, fevers over the past few days. He states that he has not had significant shortness of breath, denies any wheezing, states that he took a at home a COVID-19 test 2 days ago which was negative. His wife states that he had a coughing fit this morning and had a brief syncopal episode immediately after with about 5 seconds of generalized shaking. She states that he woke up very quickly and was back to his baseline within a minute. He denies any current headache, nausea, vomiting but does state that he has had poor appetite over the past few days. He last took Tylenol earlier this morning. PCP: Ingrid Balderas MD    Current Outpatient Medications   Medication Sig Dispense Refill    tamsulosin (FLOMAX) 0.4 mg capsule TAKE 1 CAPSULE BY MOUTH ONCE DAILY AFTER DINNER 90 Capsule 3    glimepiride (AMARYL) 2 mg tablet       sildenafil citrate (VIAGRA) 100 mg tablet Take 1 Tab by mouth daily as needed (ED). Take 1/2 to 1 tablet as needed. 10 Tab 6    OTHER daily. Indications: neutrogena testosterone booster (Patient not taking: Reported on 7/25/2021)      Cholecalciferol, Vitamin D3, 1,000 unit cap Take  by mouth. hydrochlorothiazide (HYDRODIURIL) 25 mg tablet Take 25 mg by mouth daily. sitaGLIPtin (JANUVIA) 50 mg tablet Take 50 mg by mouth daily. ketoconazole (NIZORAL) 2 % shampoo Apply  to affected area daily as needed for Itching.  (Patient not taking: Reported on 7/25/2021)      budesonide-formoterol (SYMBICORT) 160-4.5 mcg/actuation HFA inhaler Take 2 Puffs by inhalation two (2) times a day. Past History     Past Medical History:  Past Medical History:   Diagnosis Date    Aortic insufficiency     Chronic airway obstruction (HCC)     Diabetes mellitus (HCC)     Erectile dysfunction     Feeling of incomplete bladder emptying     Hepatitis B carrier (HCC)     Hyperglycemia     Hypertension     Mitral regurgitation     trace    Onychomycosis     Polyp of colon     Stiff joint     Stomach pain     Tobacco abuse disorder     Urinary urgency     Vitamin D deficiency        Past Surgical History:  Past Surgical History:   Procedure Laterality Date    HX COLONOSCOPY         Family History:  Family History   Problem Relation Age of Onset    Heart Disease Mother     Other Brother         Prostate problem     Other Brother        Social History:  Social History     Tobacco Use    Smoking status: Former     Packs/day: 1.00     Years: 30.00     Pack years: 30.00     Types: Cigarettes     Quit date: 2009     Years since quittin.2    Smokeless tobacco: Never   Substance Use Topics    Alcohol use: Yes     Alcohol/week: 0.0 standard drinks     Comment: occassionaly    Drug use: No       Allergies: Allergies   Allergen Reactions    Metformin Other (comments)     dizziness         Review of Systems   Review of Systems   Constitutional:  Positive for chills, fatigue and fever. Negative for diaphoresis. HENT:  Positive for congestion. Negative for hearing loss, rhinorrhea and sore throat. Eyes:  Negative for visual disturbance. Respiratory:  Positive for cough. Negative for wheezing. Cardiovascular:  Negative for chest pain, palpitations and leg swelling. Gastrointestinal:  Negative for abdominal pain, diarrhea, nausea and vomiting. Genitourinary:  Negative for dysuria, frequency and hematuria. Musculoskeletal:  Negative for gait problem. Skin:  Negative for rash. Neurological:  Positive for syncope.  Negative for weakness, light-headedness and headaches. Psychiatric/Behavioral:  Negative for confusion. All other systems reviewed and are negative. All Other Systems Negative  Physical Exam     Vitals:    12/17/22 1351 12/17/22 1406 12/17/22 1421 12/17/22 1436   BP: 127/65 120/73  138/79   Pulse: (!) 105 (!) 105 (!) 109 (!) 103   Resp: 30 (!) 31 21 16   Temp:       SpO2: 93% 91% 97% 96%   Weight:       Height:         Physical Exam  Vitals and nursing note reviewed. Constitutional:       Appearance: He is obese. He is ill-appearing. HENT:      Head: Normocephalic and atraumatic. Right Ear: External ear normal.      Left Ear: External ear normal.      Nose: Nose normal.      Mouth/Throat:      Pharynx: Oropharynx is clear. Eyes:      Extraocular Movements: Extraocular movements intact. Conjunctiva/sclera: Conjunctivae normal.   Cardiovascular:      Rate and Rhythm: Regular rhythm. Tachycardia present. Pulses: Normal pulses. Heart sounds: Normal heart sounds. Pulmonary:      Effort: Pulmonary effort is normal.      Breath sounds: Normal breath sounds. Abdominal:      General: Abdomen is flat. Bowel sounds are normal.      Palpations: Abdomen is soft. Musculoskeletal:         General: Normal range of motion. Cervical back: Normal range of motion and neck supple. Skin:     General: Skin is warm and dry. Capillary Refill: Capillary refill takes less than 2 seconds. Neurological:      General: No focal deficit present. Mental Status: He is alert and oriented to person, place, and time. Mental status is at baseline. Psychiatric:         Mood and Affect: Mood normal.         Behavior: Behavior normal.         Thought Content:  Thought content normal.         Judgment: Judgment normal.         Diagnostic Study Results     Labs -     Recent Results (from the past 12 hour(s))   EKG, 12 LEAD, INITIAL    Collection Time: 12/17/22 11:32 AM   Result Value Ref Range    Ventricular Rate 109 BPM    Atrial Rate 109 BPM    P-R Interval 130 ms    QRS Duration 76 ms    Q-T Interval 328 ms    QTC Calculation (Bezet) 441 ms    Calculated P Axis 67 degrees    Calculated R Axis 42 degrees    Calculated T Axis 80 degrees    Diagnosis       Sinus tachycardia  Nonspecific T wave abnormality  Abnormal ECG  When compared with ECG of 25-JUL-2021 11:33,  No significant change was found  Confirmed by Melecio Meza ([id number]) on 12/17/2022 1:31:80 PM     METABOLIC PANEL, COMPREHENSIVE    Collection Time: 12/17/22 11:40 AM   Result Value Ref Range    Sodium 134 (L) 136 - 145 mmol/L    Potassium 3.4 (L) 3.5 - 5.5 mmol/L    Chloride 96 (L) 100 - 111 mmol/L    CO2 30 21 - 32 mmol/L    Anion gap 8 3.0 - 18 mmol/L    Glucose 226 (H) 74 - 99 mg/dL    BUN 14 7.0 - 18 MG/DL    Creatinine 1.41 (H) 0.6 - 1.3 MG/DL    BUN/Creatinine ratio 10 (L) 12 - 20      eGFR 55 (L) >60 ml/min/1.73m2    Calcium 9.0 8.5 - 10.1 MG/DL    Bilirubin, total 0.7 0.2 - 1.0 MG/DL    ALT (SGPT) 40 16 - 61 U/L    AST (SGOT) 24 10 - 38 U/L    Alk. phosphatase 51 45 - 117 U/L    Protein, total 8.2 6.4 - 8.2 g/dL    Albumin 3.9 3.4 - 5.0 g/dL    Globulin 4.3 (H) 2.0 - 4.0 g/dL    A-G Ratio 0.9 0.8 - 1.7     CBC WITH AUTOMATED DIFF    Collection Time: 12/17/22 11:40 AM   Result Value Ref Range    WBC 9.2 4.6 - 13.2 K/uL    RBC 4.51 4.35 - 5.65 M/uL    HGB 13.4 13.0 - 16.0 g/dL    HCT 40.3 36.0 - 48.0 %    MCV 89.4 78.0 - 100.0 FL    MCH 29.7 24.0 - 34.0 PG    MCHC 33.3 31.0 - 37.0 g/dL    RDW 13.9 11.6 - 14.5 %    PLATELET 548 080 - 785 K/uL    MPV 10.0 9.2 - 11.8 FL    NRBC 0.0 0  WBC    ABSOLUTE NRBC 0.00 0.00 - 0.01 K/uL    NEUTROPHILS 84 (H) 40 - 73 %    LYMPHOCYTES 7 (L) 21 - 52 %    MONOCYTES 8 3 - 10 %    EOSINOPHILS 0 0 - 5 %    BASOPHILS 0 0 - 2 %    IMMATURE GRANULOCYTES 0 0.0 - 0.5 %    ABS. NEUTROPHILS 7.7 1.8 - 8.0 K/UL    ABS. LYMPHOCYTES 0.7 (L) 0.9 - 3.6 K/UL    ABS. MONOCYTES 0.7 0.05 - 1.2 K/UL    ABS.  EOSINOPHILS 0.0 0.0 - 0.4 K/UL ABS. BASOPHILS 0.0 0.0 - 0.1 K/UL    ABS. IMM. GRANS. 0.0 0.00 - 0.04 K/UL    DF AUTOMATED     TROPONIN-HIGH SENSITIVITY    Collection Time: 12/17/22 11:40 AM   Result Value Ref Range    Troponin-High Sensitivity 12 0 - 78 ng/L   COVID-19 WITH INFLUENZA A/B    Collection Time: 12/17/22 12:14 PM   Result Value Ref Range    SARS-CoV-2 by PCR Not detected NOTD      Influenza A by PCR Detected (A) NOTD      Influenza B by PCR Not detected NOTD         Radiologic Studies -   XR CHEST PORT   Final Result      No evidence of acute pulmonary disease. CT Results  (Last 48 hours)      None          CXR Results  (Last 48 hours)                 12/17/22 1211  XR CHEST PORT Final result    Impression:      No evidence of acute pulmonary disease. Narrative:  AP CHEST, PORTABLE       INDICATION: Above. Cough, congestion       COMPARISON: 7/25/2021. TECHNIQUE: Portable supine AP chest radiograph is reviewed. FINDINGS:       EKG leads/wires overlie the patient. No evidence of focal pulmonary consolidation or pulmonary edema. No evidence of   pneumothorax. The costophrenic sulci appear sharp. The cardiac silhouette is within normal limits in size. Medical Decision Making   I am the first provider for this patient. I reviewed the vital signs, available nursing notes, past medical history, past surgical history, family history and social history. Vital Signs-Reviewed the patient's vital signs. Records Reviewed: Nursing Notes and Old Medical Records     Procedures: None   Procedures    Provider Notes (Medical Decision Making):   60-year-old man presenting to the emergency department with complaints of cough, congestion, fever, poor p.o. intake for the past few days. We will check influenza and COVID tests, plan to check lab work, EKG to evaluate his syncope though I suspect it is most likely posttussive.   This does not sound like any sort of seizure activity. Plan to hydrate with IV fluids, give medications to help reduce his fever and will reassess. ED Course as of 12/17/22 2033   Sat Dec 17, 2022   1157 EKG, 12 LEAD, INITIAL  Rate 109, and his tachycardia, normal intervals, normal axis, nonspecific T wave abnormalities, no acute ST changes. [JR]   7287 Patient states that he is feeling much improved, updated patient results of influenza test, lab work and x-ray. Plan to discharge home with symptomatic treatment as he is out of the window for treatment with Tamiflu. Questions were answered, return precautions discussed, patient and wife comfortable with discharge home. [JR]      ED Course User Index  [JR] Wendi Almaraz MD         MED RECONCILIATION:  No current facility-administered medications for this encounter. Current Outpatient Medications   Medication Sig    tamsulosin (FLOMAX) 0.4 mg capsule TAKE 1 CAPSULE BY MOUTH ONCE DAILY AFTER DINNER    glimepiride (AMARYL) 2 mg tablet     sildenafil citrate (VIAGRA) 100 mg tablet Take 1 Tab by mouth daily as needed (ED). Take 1/2 to 1 tablet as needed. OTHER daily. Indications: neutrogena testosterone booster (Patient not taking: Reported on 7/25/2021)    Cholecalciferol, Vitamin D3, 1,000 unit cap Take  by mouth. hydrochlorothiazide (HYDRODIURIL) 25 mg tablet Take 25 mg by mouth daily. sitaGLIPtin (JANUVIA) 50 mg tablet Take 50 mg by mouth daily. ketoconazole (NIZORAL) 2 % shampoo Apply  to affected area daily as needed for Itching. (Patient not taking: Reported on 7/25/2021)    budesonide-formoterol (SYMBICORT) 160-4.5 mcg/actuation HFA inhaler Take 2 Puffs by inhalation two (2) times a day. Disposition:  Home     DISCHARGE NOTE:   Pt has been reexamined. Patient has no new complaints, changes, or physical findings. Care plan outlined and precautions discussed. Results of workup were reviewed with the patient. All medications were reviewed with the patient.  All of pt's questions and concerns were addressed. Patient was instructed and agrees to follow up with PCP as well as to return to the ED upon further deterioration. Patient is ready to go home. Follow-up Information       Follow up With Specialties Details Why Contact Info    Nydia Matson MD Family Medicine  As needed [address] [place] [address]  [phone]              Discharge Medication List as of 12/17/2022  2:46 PM              Diagnosis     Clinical Impression:   1. Influenza A          \"Please note that this dictation was completed with [employer], the computer voice recognition software. Quite often unanticipated grammatical, syntax, homophones, and other interpretive errors are inadvertently transcribed by the computer software. Please disregard these errors. Please excuse any errors that have escaped final proofreading. \"

## 2022-12-17 NOTE — ED NOTES
Pt came from triage, placed on monitor. Temp: 100.1F. Wife at bedside and stated that pt has cough and fever x 3days now. And wife suspected that pt had an episode of seizure because pt passed out after coughing at home.     Dr. Rogenia Hatchet at bedside

## 2022-12-17 NOTE — ED TRIAGE NOTES
Pt states cough primarily, but feels achy, and congested; Pt states multiple times during a coughing fit, he PASSED OUT, witnessed by wife. Multiple episodes. Hx COPD, hypertension, Type 2 diabetes.    ML